# Patient Record
Sex: MALE | URBAN - METROPOLITAN AREA
[De-identification: names, ages, dates, MRNs, and addresses within clinical notes are randomized per-mention and may not be internally consistent; named-entity substitution may affect disease eponyms.]

---

## 2023-01-01 ENCOUNTER — EMERGENCY (EMERGENCY)
Facility: HOSPITAL | Age: 78
LOS: 1 days | Discharge: HOME | End: 2023-01-01
Attending: EMERGENCY MEDICINE | Admitting: EMERGENCY MEDICINE
Payer: MEDICARE

## 2023-01-01 VITALS
TEMPERATURE: 98 F | WEIGHT: 177.91 LBS | HEART RATE: 63 BPM | DIASTOLIC BLOOD PRESSURE: 64 MMHG | RESPIRATION RATE: 16 BRPM | SYSTOLIC BLOOD PRESSURE: 96 MMHG | OXYGEN SATURATION: 99 % | HEIGHT: 71 IN

## 2023-01-01 VITALS
SYSTOLIC BLOOD PRESSURE: 154 MMHG | RESPIRATION RATE: 18 BRPM | HEART RATE: 100 BPM | HEIGHT: 71 IN | OXYGEN SATURATION: 98 % | WEIGHT: 173.06 LBS | DIASTOLIC BLOOD PRESSURE: 96 MMHG | TEMPERATURE: 98 F

## 2023-01-01 VITALS
RESPIRATION RATE: 16 BRPM | SYSTOLIC BLOOD PRESSURE: 142 MMHG | OXYGEN SATURATION: 98 % | HEART RATE: 82 BPM | DIASTOLIC BLOOD PRESSURE: 85 MMHG

## 2023-01-01 VITALS
HEART RATE: 72 BPM | OXYGEN SATURATION: 98 % | SYSTOLIC BLOOD PRESSURE: 140 MMHG | DIASTOLIC BLOOD PRESSURE: 78 MMHG | TEMPERATURE: 98 F | RESPIRATION RATE: 20 BRPM

## 2023-01-01 LAB
ALBUMIN SERPL ELPH-MCNC: 3 G/DL — LOW (ref 3.3–5)
ALP SERPL-CCNC: 51 U/L — SIGNIFICANT CHANGE UP (ref 30–120)
ALT FLD-CCNC: 20 U/L DA — SIGNIFICANT CHANGE UP (ref 10–60)
ANION GAP SERPL CALC-SCNC: 8 MMOL/L — SIGNIFICANT CHANGE UP (ref 5–17)
APPEARANCE UR: CLEAR — SIGNIFICANT CHANGE UP
APTT BLD: 25.4 SEC — LOW (ref 27.5–35.5)
AST SERPL-CCNC: 16 U/L — SIGNIFICANT CHANGE UP (ref 10–40)
BASOPHILS # BLD AUTO: 0.02 K/UL — SIGNIFICANT CHANGE UP (ref 0–0.2)
BASOPHILS NFR BLD AUTO: 0.4 % — SIGNIFICANT CHANGE UP (ref 0–2)
BILIRUB SERPL-MCNC: 1.2 MG/DL — SIGNIFICANT CHANGE UP (ref 0.2–1.2)
BILIRUB UR-MCNC: NEGATIVE — SIGNIFICANT CHANGE UP
BUN SERPL-MCNC: 28 MG/DL — HIGH (ref 7–23)
CALCIUM SERPL-MCNC: 8.7 MG/DL — SIGNIFICANT CHANGE UP (ref 8.4–10.5)
CHLORIDE SERPL-SCNC: 104 MMOL/L — SIGNIFICANT CHANGE UP (ref 96–108)
CO2 SERPL-SCNC: 24 MMOL/L — SIGNIFICANT CHANGE UP (ref 22–31)
COLOR SPEC: YELLOW — SIGNIFICANT CHANGE UP
CREAT SERPL-MCNC: 1.53 MG/DL — HIGH (ref 0.5–1.3)
DIFF PNL FLD: NEGATIVE — SIGNIFICANT CHANGE UP
EGFR: 46 ML/MIN/1.73M2 — LOW
EOSINOPHIL # BLD AUTO: 0.07 K/UL — SIGNIFICANT CHANGE UP (ref 0–0.5)
EOSINOPHIL NFR BLD AUTO: 1.5 % — SIGNIFICANT CHANGE UP (ref 0–6)
GLUCOSE SERPL-MCNC: 241 MG/DL — HIGH (ref 70–99)
GLUCOSE UR QL: 250 MG/DL
HCT VFR BLD CALC: 40.3 % — SIGNIFICANT CHANGE UP (ref 39–50)
HGB BLD-MCNC: 13.6 G/DL — SIGNIFICANT CHANGE UP (ref 13–17)
IMM GRANULOCYTES NFR BLD AUTO: 0.8 % — SIGNIFICANT CHANGE UP (ref 0–0.9)
INR BLD: 1.08 RATIO — SIGNIFICANT CHANGE UP (ref 0.88–1.16)
KETONES UR-MCNC: ABNORMAL MG/DL
LEUKOCYTE ESTERASE UR-ACNC: NEGATIVE — SIGNIFICANT CHANGE UP
LYMPHOCYTES # BLD AUTO: 0.73 K/UL — LOW (ref 1–3.3)
LYMPHOCYTES # BLD AUTO: 15.3 % — SIGNIFICANT CHANGE UP (ref 13–44)
MCHC RBC-ENTMCNC: 33.4 PG — SIGNIFICANT CHANGE UP (ref 27–34)
MCHC RBC-ENTMCNC: 33.7 GM/DL — SIGNIFICANT CHANGE UP (ref 32–36)
MCV RBC AUTO: 99 FL — SIGNIFICANT CHANGE UP (ref 80–100)
MONOCYTES # BLD AUTO: 0.46 K/UL — SIGNIFICANT CHANGE UP (ref 0–0.9)
MONOCYTES NFR BLD AUTO: 9.7 % — SIGNIFICANT CHANGE UP (ref 2–14)
NEUTROPHILS # BLD AUTO: 3.44 K/UL — SIGNIFICANT CHANGE UP (ref 1.8–7.4)
NEUTROPHILS NFR BLD AUTO: 72.3 % — SIGNIFICANT CHANGE UP (ref 43–77)
NITRITE UR-MCNC: NEGATIVE — SIGNIFICANT CHANGE UP
NRBC # BLD: 0 /100 WBCS — SIGNIFICANT CHANGE UP (ref 0–0)
PH UR: 6.5 — SIGNIFICANT CHANGE UP (ref 5–8)
PLATELET # BLD AUTO: 136 K/UL — LOW (ref 150–400)
POTASSIUM SERPL-MCNC: 3.8 MMOL/L — SIGNIFICANT CHANGE UP (ref 3.5–5.3)
POTASSIUM SERPL-SCNC: 3.8 MMOL/L — SIGNIFICANT CHANGE UP (ref 3.5–5.3)
PROT SERPL-MCNC: 6.5 G/DL — SIGNIFICANT CHANGE UP (ref 6–8.3)
PROT UR-MCNC: NEGATIVE MG/DL — SIGNIFICANT CHANGE UP
PROTHROM AB SERPL-ACNC: 12.7 SEC — SIGNIFICANT CHANGE UP (ref 10.5–13.4)
RBC # BLD: 4.07 M/UL — LOW (ref 4.2–5.8)
RBC # FLD: 14.6 % — HIGH (ref 10.3–14.5)
SODIUM SERPL-SCNC: 136 MMOL/L — SIGNIFICANT CHANGE UP (ref 135–145)
SP GR SPEC: 1.02 — SIGNIFICANT CHANGE UP (ref 1–1.03)
TROPONIN I, HIGH SENSITIVITY RESULT: 7.8 NG/L — SIGNIFICANT CHANGE UP
UROBILINOGEN FLD QL: 1 MG/DL — SIGNIFICANT CHANGE UP (ref 0.2–1)
WBC # BLD: 4.76 K/UL — SIGNIFICANT CHANGE UP (ref 3.8–10.5)
WBC # FLD AUTO: 4.76 K/UL — SIGNIFICANT CHANGE UP (ref 3.8–10.5)

## 2023-01-01 PROCEDURE — 99284 EMERGENCY DEPT VISIT MOD MDM: CPT

## 2023-01-01 PROCEDURE — 85730 THROMBOPLASTIN TIME PARTIAL: CPT

## 2023-01-01 PROCEDURE — 99285 EMERGENCY DEPT VISIT HI MDM: CPT

## 2023-01-01 PROCEDURE — 85025 COMPLETE CBC W/AUTO DIFF WBC: CPT

## 2023-01-01 PROCEDURE — 93010 ELECTROCARDIOGRAM REPORT: CPT

## 2023-01-01 PROCEDURE — 85610 PROTHROMBIN TIME: CPT

## 2023-01-01 PROCEDURE — 80053 COMPREHEN METABOLIC PANEL: CPT

## 2023-01-01 PROCEDURE — 36415 COLL VENOUS BLD VENIPUNCTURE: CPT

## 2023-01-01 PROCEDURE — 93005 ELECTROCARDIOGRAM TRACING: CPT

## 2023-01-01 PROCEDURE — 84484 ASSAY OF TROPONIN QUANT: CPT

## 2023-01-01 PROCEDURE — 81003 URINALYSIS AUTO W/O SCOPE: CPT

## 2023-01-01 PROCEDURE — 70450 CT HEAD/BRAIN W/O DYE: CPT | Mod: 26,MA

## 2023-01-01 PROCEDURE — 70450 CT HEAD/BRAIN W/O DYE: CPT | Mod: MA

## 2023-01-01 PROCEDURE — 99285 EMERGENCY DEPT VISIT HI MDM: CPT | Mod: 25

## 2023-01-01 PROCEDURE — 71045 X-RAY EXAM CHEST 1 VIEW: CPT

## 2023-01-01 PROCEDURE — 96360 HYDRATION IV INFUSION INIT: CPT

## 2023-01-01 PROCEDURE — 71045 X-RAY EXAM CHEST 1 VIEW: CPT | Mod: 26

## 2023-01-01 RX ORDER — SODIUM CHLORIDE 9 MG/ML
1000 INJECTION INTRAMUSCULAR; INTRAVENOUS; SUBCUTANEOUS ONCE
Refills: 0 | Status: COMPLETED | OUTPATIENT
Start: 2023-01-01 | End: 2023-01-01

## 2023-01-01 RX ADMIN — SODIUM CHLORIDE 1000 MILLILITER(S): 9 INJECTION INTRAMUSCULAR; INTRAVENOUS; SUBCUTANEOUS at 10:09

## 2023-01-01 RX ADMIN — SODIUM CHLORIDE 1000 MILLILITER(S): 9 INJECTION INTRAMUSCULAR; INTRAVENOUS; SUBCUTANEOUS at 09:09

## 2023-07-23 NOTE — ED PROVIDER NOTE - PROGRESS NOTE DETAILS
EKG CT and labs all normal except for elevated blood sugar states he is not a diabetic and had recent hemoglobin A1c which was normal.  Could be stress related hyperglycemia.  Patient refusing further evaluation including neuro/cardio consults.  Wants to leave and will follow-up with his doctor tomorrow.  Wife in agreement with plan.  Encourage patient to drink more fluids and try to get regular sleep schedule.

## 2023-07-23 NOTE — ED PROVIDER NOTE - PATIENT PORTAL LINK FT
You can access the FollowMyHealth Patient Portal offered by [facility] by registering at the following website: [email or web address]. By joining [employer]’s FollowMyHealth portal, you will also be able to view your health information using other applications (apps) compatible with our system.

## 2023-07-23 NOTE — ED ADULT NURSE NOTE - CHIEF COMPLAINT QUOTE
syncope   pt s/p colonoscopy 1 week ago and 2 polyps removed. denies black stool no chest, abd or head pain

## 2023-07-23 NOTE — ED PROVIDER NOTE - CHPI ED SYMPTOMS NEG
no back pain/no chest pain/no cough/no fever/no nausea/no shortness of breath/no vomiting/no chills/no diaphoresis

## 2023-07-23 NOTE — ED ADULT TRIAGE NOTE - CHIEF COMPLAINT QUOTE
syncope syncope   pt s/p colonoscopy 1 week ago and 2 polyps removed. denies black stool no chest, abd or head pain

## 2023-07-23 NOTE — ED PROVIDER NOTE - OBJECTIVE STATEMENT
78-year-old male with history of hypertension and ulcerative colitis brought by ambulance from home after a syncopal episode while sitting at the kitchen table today.  Wife states patient slumped in his chair and was briefly unresponsive.  Now back to baseline.  Feels well denies any history or symptom.  Denies headache neck pain chest pain shortness of breath cough nausea vomiting diarrhea dysuria hematuria melena or other symptom.  States he had recent colonoscopy with polypectomy but otherwise uneventful course.  His PCP is Dr. Ray

## 2023-07-23 NOTE — ED PROVIDER NOTE - CLINICAL SUMMARY MEDICAL DECISION MAKING FREE TEXT BOX
78-year-old male with history of hypertension and ulcerative colitis brought by ambulance from home after a syncopal episode while sitting at the kitchen table today.  Wife states patient slumped in his chair and was briefly unresponsive.  Now back to baseline.  Feels well denies any history or symptom.  Denies headache neck pain chest pain shortness of breath cough nausea vomiting diarrhea dysuria hematuria melena or other symptom.  States he had recent colonoscopy with polypectomy but otherwise uneventful course.  His PCP is Dr. Ray    Physical exam is now normal patient was mildly hypotensive but now normal.  Impression syncope.  Plan is syncope work-up if negative will need outpatient follow-up for further evaluation and treatment.

## 2023-07-23 NOTE — ED PROVIDER NOTE - CARE PROVIDER_API CALL
Holger Ray  Internal Medicine  [address] [place] [address]  Phone: [phone]  Fax: [phone]  Established Patient  Follow Up Time: Urgent

## 2023-07-23 NOTE — ED ADULT NURSE NOTE - OBJECTIVE STATEMENT
77 y/o M PMH HTN, Ulcerative Colitis BIB EMS from home for syncopal event. Per patient he was running around the house doing errands, sat down then felt dizzy and passed out. Thinks he was out for a minute or two. Witnessed by wife, no report of sz activity. No fall or head strike. States he ahs been in usual state of health, yesterday walked 5 miles in the city. Denies similar episodes in past, no echo or stress test hx. Of note, pt had colon polyps removed last week but denies melena or hematochezia. Denies CP, SOB, n/v/d, fevers, chills, abdominal pain, urinary symptoms, weakness, fatigue, numbness, tingling in upper and lower extremities, HA, blurry vision. VSS updated on plan of care.

## 2023-12-30 PROBLEM — Z86.79 PERSONAL HISTORY OF OTHER DISEASES OF THE CIRCULATORY SYSTEM: Chronic | Status: ACTIVE | Noted: 2023-01-01

## 2023-12-30 PROBLEM — Z87.19 PERSONAL HISTORY OF OTHER DISEASES OF THE DIGESTIVE SYSTEM: Chronic | Status: ACTIVE | Noted: 2023-01-01

## 2023-12-30 NOTE — ED PROVIDER NOTE - CARE PROVIDER_API CALL
Holger Ray  Internal Medicine  [address] [place] [address]  Phone: [phone]  Fax: [phone]  Established Patient  Follow Up Time: 1-3 Days   Holger Ray  Internal Medicine  [address] [place] [address]  Phone: [phone]  Fax: [phone]  Established Patient  Follow Up Time: 1-3 Days

## 2023-12-30 NOTE — ED ADULT NURSE NOTE - NSFALLUNIVINTERV_ED_ALL_ED
Bed/Stretcher in lowest position, wheels locked, appropriate side rails in place/Call bell, personal items and telephone in reach/Instruct patient to call for assistance before getting out of bed/chair/stretcher/Non-slip footwear applied when patient is off stretcher/[place] to call system/Physically safe environment - no spills, clutter or unnecessary equipment/Purposeful proactive rounding/Room/bathroom lighting operational, light cord in reach Bed/Stretcher in lowest position, wheels locked, appropriate side rails in place/Call bell, personal items and telephone in reach/Instruct patient to call for assistance before getting out of bed/chair/stretcher/Non-slip footwear applied when patient is off stretcher/[place] to call system/Physically safe environment - no spills, clutter or unnecessary equipment/Purposeful proactive rounding/Room/bathroom lighting operational, light cord in reach

## 2023-12-30 NOTE — ED PROVIDER NOTE - PATIENT PORTAL LINK FT
You can access the FollowMyHealth Patient Portal offered by [facility] by registering at the following website: [email or web address]. By joining [email or web address]’s FollowMyHealth portal, you will also be able to view your health information using other applications (apps) compatible with our system. You can access the FollowMyHealth Patient Portal offered by [facility] by registering at the following website: [email or web address]. By joining [employer]’s FollowMyHealth portal, you will also be able to view your health information using other applications (apps) compatible with our system.

## 2023-12-30 NOTE — ED PROVIDER NOTE - OBJECTIVE STATEMENT
78-year-old male with history of hypertension on valsartan and complaining of episodes of elevated blood pressure and anxiety over the past few years.  Yesterday became very anxious and noted blood pressure was 200/100.  This morning took blood pressure and it was 180.  Has not taken his valsartan yet today.  Denies chest pain headache nausea vomiting visual disturbance shortness of breath vomiting diarrhea or other symptom. Patient was told by his PCP that he should be seeing a therapist and taking antianxiety medicine but patient has refused.  Had recent cardiac workup including stress test echo which he says was all normal.  Was told he should also see a sleep specialist since he does not sleep.  PCP Cory

## 2023-12-30 NOTE — ED PROVIDER NOTE - NSCAREINITIATED _GEN_ER
Moo Palencia(Attending) Bexarotene Counseling:  I discussed with the patient the risks of bexarotene including but not limited to hair loss, dry lips/skin/eyes, liver abnormalities, hyperlipidemia, pancreatitis, depression/suicidal ideation, photosensitivity, drug rash/allergic reactions, hypothyroidism, anemia, leukopenia, infection, cataracts, and teratogenicity.  Patient understands that they will need regular blood tests to check lipid profile, liver function tests, white blood cell count, thyroid function tests and pregnancy test if applicable.

## 2023-12-30 NOTE — ED PROVIDER NOTE - CLINICAL SUMMARY MEDICAL DECISION MAKING FREE TEXT BOX
78-year-old male with history of hypertension on valsartan and complaining of episodes of elevated blood pressure and anxiety over the past few years.  Yesterday became very anxious and noted blood pressure was 200/100.  This morning took blood pressure and it was 180.  Has not taken his valsartan yet today.  Denies chest pain headache nausea vomiting visual disturbance shortness of breath vomiting diarrhea or other symptom. Patient was told by his PCP that he should be seeing a therapist and taking antianxiety medicine but patient has refused.  Had recent cardiac workup including stress test echo which he says was all normal.  Was told he should also see a sleep specialist since he does not sleep.  PCP Bendit    Physical exam is normal.  BP is now 140/80 patient feels well.  EKG is normal.  Elevated BP readings likely related to anxiety and not to cardiac issue.  Plan is reassurance and recommend follow-up with PCP/psychiatry for further management of anxiety and BP. Patient and wife at bedside in agreement with plan.

## 2024-01-01 ENCOUNTER — EMERGENCY (EMERGENCY)
Facility: HOSPITAL | Age: 79
LOS: 1 days | Discharge: PSYCHIATRIC OR SUBSTANCE USE FACILITY | End: 2024-01-01
Attending: EMERGENCY MEDICINE | Admitting: EMERGENCY MEDICINE
Payer: MEDICARE

## 2024-01-01 ENCOUNTER — INPATIENT (INPATIENT)
Facility: HOSPITAL | Age: 79
LOS: 0 days | DRG: 922 | End: 2024-04-12
Attending: HOSPITALIST | Admitting: HOSPITALIST
Payer: MEDICARE

## 2024-01-01 VITALS
DIASTOLIC BLOOD PRESSURE: 83 MMHG | HEIGHT: 71 IN | TEMPERATURE: 98 F | RESPIRATION RATE: 18 BRPM | HEART RATE: 88 BPM | WEIGHT: 165.35 LBS | SYSTOLIC BLOOD PRESSURE: 154 MMHG | OXYGEN SATURATION: 97 %

## 2024-01-01 VITALS — HEIGHT: 71 IN | WEIGHT: 175.05 LBS

## 2024-01-01 VITALS
DIASTOLIC BLOOD PRESSURE: 90 MMHG | OXYGEN SATURATION: 96 % | TEMPERATURE: 98 F | SYSTOLIC BLOOD PRESSURE: 143 MMHG | RESPIRATION RATE: 20 BRPM | HEART RATE: 83 BPM

## 2024-01-01 VITALS — TEMPERATURE: 99 F

## 2024-01-01 DIAGNOSIS — I46.9 CARDIAC ARREST, CAUSE UNSPECIFIED: ICD-10-CM

## 2024-01-01 DIAGNOSIS — F32.A DEPRESSION, UNSPECIFIED: ICD-10-CM

## 2024-01-01 DIAGNOSIS — F22 DELUSIONAL DISORDERS: ICD-10-CM

## 2024-01-01 LAB
A1C WITH ESTIMATED AVERAGE GLUCOSE RESULT: 6.7 % — HIGH (ref 4–5.6)
ALBUMIN SERPL ELPH-MCNC: 1.9 G/DL — LOW (ref 3.3–5)
ALBUMIN SERPL ELPH-MCNC: 2.5 G/DL — LOW (ref 3.3–5)
ALBUMIN SERPL ELPH-MCNC: 3.6 G/DL — SIGNIFICANT CHANGE UP (ref 3.3–5)
ALP SERPL-CCNC: 115 U/L — SIGNIFICANT CHANGE UP (ref 30–120)
ALP SERPL-CCNC: 156 U/L — HIGH (ref 30–120)
ALP SERPL-CCNC: 71 U/L — SIGNIFICANT CHANGE UP (ref 30–120)
ALT FLD-CCNC: 24 U/L — SIGNIFICANT CHANGE UP (ref 10–60)
ALT FLD-CCNC: 261 U/L — HIGH (ref 10–60)
ALT FLD-CCNC: 639 U/L — HIGH (ref 10–60)
AMPHET UR-MCNC: NEGATIVE — SIGNIFICANT CHANGE UP
ANION GAP SERPL CALC-SCNC: 10 MMOL/L — SIGNIFICANT CHANGE UP (ref 5–17)
ANION GAP SERPL CALC-SCNC: 15 MMOL/L — SIGNIFICANT CHANGE UP (ref 5–17)
ANION GAP SERPL CALC-SCNC: 21 MMOL/L — HIGH (ref 5–17)
APAP SERPL-MCNC: 7 UG/ML — LOW (ref 10–30)
APAP SERPL-MCNC: <1 UG/ML — LOW (ref 10–30)
APPEARANCE UR: CLEAR — SIGNIFICANT CHANGE UP
APPEARANCE UR: CLEAR — SIGNIFICANT CHANGE UP
AST SERPL-CCNC: 22 U/L — SIGNIFICANT CHANGE UP (ref 10–40)
AST SERPL-CCNC: 313 U/L — HIGH (ref 10–40)
AST SERPL-CCNC: 491 U/L — HIGH (ref 10–40)
BACTERIA # UR AUTO: NEGATIVE /HPF — SIGNIFICANT CHANGE UP
BARBITURATES UR SCN-MCNC: NEGATIVE — SIGNIFICANT CHANGE UP
BASE EXCESS BLDA CALC-SCNC: -12.3 MMOL/L — LOW (ref -2–3)
BASE EXCESS BLDA CALC-SCNC: -13.4 MMOL/L — LOW (ref -2–3)
BASOPHILS # BLD AUTO: 0 K/UL — SIGNIFICANT CHANGE UP (ref 0–0.2)
BASOPHILS # BLD AUTO: 0.02 K/UL — SIGNIFICANT CHANGE UP (ref 0–0.2)
BASOPHILS NFR BLD AUTO: 0 % — SIGNIFICANT CHANGE UP (ref 0–2)
BASOPHILS NFR BLD AUTO: 0.3 % — SIGNIFICANT CHANGE UP (ref 0–2)
BENZODIAZ UR-MCNC: NEGATIVE — SIGNIFICANT CHANGE UP
BILIRUB SERPL-MCNC: 0.7 MG/DL — SIGNIFICANT CHANGE UP (ref 0.2–1.2)
BILIRUB SERPL-MCNC: 0.8 MG/DL — SIGNIFICANT CHANGE UP (ref 0.2–1.2)
BILIRUB SERPL-MCNC: 1.1 MG/DL — SIGNIFICANT CHANGE UP (ref 0.2–1.2)
BILIRUB UR-MCNC: NEGATIVE — SIGNIFICANT CHANGE UP
BILIRUB UR-MCNC: NEGATIVE — SIGNIFICANT CHANGE UP
BUN SERPL-MCNC: 28 MG/DL — HIGH (ref 7–23)
BUN SERPL-MCNC: 29 MG/DL — HIGH (ref 7–23)
BUN SERPL-MCNC: 52 MG/DL — HIGH (ref 7–23)
CALCIUM SERPL-MCNC: 8.6 MG/DL — SIGNIFICANT CHANGE UP (ref 8.4–10.5)
CALCIUM SERPL-MCNC: 8.9 MG/DL — SIGNIFICANT CHANGE UP (ref 8.4–10.5)
CALCIUM SERPL-MCNC: 9.1 MG/DL — SIGNIFICANT CHANGE UP (ref 8.4–10.5)
CHLORIDE SERPL-SCNC: 102 MMOL/L — SIGNIFICANT CHANGE UP (ref 96–108)
CHLORIDE SERPL-SCNC: 105 MMOL/L — SIGNIFICANT CHANGE UP (ref 96–108)
CHLORIDE SERPL-SCNC: 107 MMOL/L — SIGNIFICANT CHANGE UP (ref 96–108)
CK MB BLD-MCNC: 1.8 % — SIGNIFICANT CHANGE UP (ref 0–3.5)
CK MB CFR SERPL CALC: 3 NG/ML — SIGNIFICANT CHANGE UP (ref 0–3.6)
CK SERPL-CCNC: 171 U/L — SIGNIFICANT CHANGE UP (ref 39–308)
CO2 SERPL-SCNC: 18 MMOL/L — LOW (ref 22–31)
CO2 SERPL-SCNC: 22 MMOL/L — SIGNIFICANT CHANGE UP (ref 22–31)
CO2 SERPL-SCNC: 26 MMOL/L — SIGNIFICANT CHANGE UP (ref 22–31)
COCAINE METAB.OTHER UR-MCNC: NEGATIVE — SIGNIFICANT CHANGE UP
COLOR SPEC: YELLOW — SIGNIFICANT CHANGE UP
COLOR SPEC: YELLOW — SIGNIFICANT CHANGE UP
CREAT SERPL-MCNC: 1.57 MG/DL — HIGH (ref 0.5–1.3)
CREAT SERPL-MCNC: 1.88 MG/DL — HIGH (ref 0.5–1.3)
CREAT SERPL-MCNC: 3.64 MG/DL — HIGH (ref 0.5–1.3)
CULTURE RESULTS: SIGNIFICANT CHANGE UP
DIFF PNL FLD: ABNORMAL
DIFF PNL FLD: ABNORMAL
EGFR: 16 ML/MIN/1.73M2 — LOW
EGFR: 36 ML/MIN/1.73M2 — LOW
EGFR: 45 ML/MIN/1.73M2 — LOW
EOSINOPHIL # BLD AUTO: 0.01 K/UL — SIGNIFICANT CHANGE UP (ref 0–0.5)
EOSINOPHIL # BLD AUTO: 0.07 K/UL — SIGNIFICANT CHANGE UP (ref 0–0.5)
EOSINOPHIL NFR BLD AUTO: 0.1 % — SIGNIFICANT CHANGE UP (ref 0–6)
EOSINOPHIL NFR BLD AUTO: 1 % — SIGNIFICANT CHANGE UP (ref 0–6)
ESTIMATED AVERAGE GLUCOSE: 146 MG/DL — HIGH (ref 68–114)
ETHANOL SERPL-MCNC: <3 MG/DL — SIGNIFICANT CHANGE UP (ref 0–3)
ETHANOL SERPL-MCNC: <3 MG/DL — SIGNIFICANT CHANGE UP (ref 0–3)
GAS PNL BLDA: SIGNIFICANT CHANGE UP
GLUCOSE BLDC GLUCOMTR-MCNC: 308 MG/DL — HIGH (ref 70–99)
GLUCOSE BLDC GLUCOMTR-MCNC: 350 MG/DL — HIGH (ref 70–99)
GLUCOSE SERPL-MCNC: 141 MG/DL — HIGH (ref 70–99)
GLUCOSE SERPL-MCNC: 213 MG/DL — HIGH (ref 70–99)
GLUCOSE SERPL-MCNC: 407 MG/DL — HIGH (ref 70–99)
GLUCOSE UR QL: 250 MG/DL
GLUCOSE UR QL: NEGATIVE MG/DL — SIGNIFICANT CHANGE UP
HCO3 BLDA-SCNC: 16 MMOL/L — LOW (ref 21–28)
HCO3 BLDA-SCNC: 17 MMOL/L — LOW (ref 21–28)
HCT VFR BLD CALC: 41.9 % — SIGNIFICANT CHANGE UP (ref 39–50)
HCT VFR BLD CALC: 44.4 % — SIGNIFICANT CHANGE UP (ref 39–50)
HCT VFR BLD CALC: 46.6 % — SIGNIFICANT CHANGE UP (ref 39–50)
HGB BLD-MCNC: 13.3 G/DL — SIGNIFICANT CHANGE UP (ref 13–17)
HGB BLD-MCNC: 14.8 G/DL — SIGNIFICANT CHANGE UP (ref 13–17)
HGB BLD-MCNC: 14.9 G/DL — SIGNIFICANT CHANGE UP (ref 13–17)
HOROWITZ INDEX BLDA+IHG-RTO: 100 — SIGNIFICANT CHANGE UP
HOROWITZ INDEX BLDA+IHG-RTO: 75 — SIGNIFICANT CHANGE UP
IMM GRANULOCYTES NFR BLD AUTO: 0.3 % — SIGNIFICANT CHANGE UP (ref 0–0.9)
KETONES UR-MCNC: ABNORMAL MG/DL
KETONES UR-MCNC: NEGATIVE MG/DL — SIGNIFICANT CHANGE UP
LEUKOCYTE ESTERASE UR-ACNC: NEGATIVE — SIGNIFICANT CHANGE UP
LEUKOCYTE ESTERASE UR-ACNC: NEGATIVE — SIGNIFICANT CHANGE UP
LIDOCAIN IGE QN: 176 U/L — HIGH (ref 16–77)
LIDOCAIN IGE QN: 85 U/L — HIGH (ref 16–77)
LYMPHOCYTES # BLD AUTO: 0.82 K/UL — LOW (ref 1–3.3)
LYMPHOCYTES # BLD AUTO: 1.78 K/UL — SIGNIFICANT CHANGE UP (ref 1–3.3)
LYMPHOCYTES # BLD AUTO: 12.3 % — LOW (ref 13–44)
LYMPHOCYTES # BLD AUTO: 27 % — SIGNIFICANT CHANGE UP (ref 13–44)
MAGNESIUM SERPL-MCNC: 2 MG/DL — SIGNIFICANT CHANGE UP (ref 1.6–2.6)
MAGNESIUM SERPL-MCNC: 2.3 MG/DL — SIGNIFICANT CHANGE UP (ref 1.6–2.6)
MCHC RBC-ENTMCNC: 31.7 GM/DL — LOW (ref 32–36)
MCHC RBC-ENTMCNC: 31.8 GM/DL — LOW (ref 32–36)
MCHC RBC-ENTMCNC: 31.9 PG — SIGNIFICANT CHANGE UP (ref 27–34)
MCHC RBC-ENTMCNC: 32.2 PG — SIGNIFICANT CHANGE UP (ref 27–34)
MCHC RBC-ENTMCNC: 32.4 PG — SIGNIFICANT CHANGE UP (ref 27–34)
MCHC RBC-ENTMCNC: 33.6 GM/DL — SIGNIFICANT CHANGE UP (ref 32–36)
MCV RBC AUTO: 100.4 FL — HIGH (ref 80–100)
MCV RBC AUTO: 101.9 FL — HIGH (ref 80–100)
MCV RBC AUTO: 95.9 FL — SIGNIFICANT CHANGE UP (ref 80–100)
METHADONE UR-MCNC: NEGATIVE — SIGNIFICANT CHANGE UP
MONOCYTES # BLD AUTO: 0.6 K/UL — SIGNIFICANT CHANGE UP (ref 0–0.9)
MONOCYTES # BLD AUTO: 0.86 K/UL — SIGNIFICANT CHANGE UP (ref 0–0.9)
MONOCYTES NFR BLD AUTO: 13 % — SIGNIFICANT CHANGE UP (ref 2–14)
MONOCYTES NFR BLD AUTO: 9 % — SIGNIFICANT CHANGE UP (ref 2–14)
NEUTROPHILS # BLD AUTO: 3.75 K/UL — SIGNIFICANT CHANGE UP (ref 1.8–7.4)
NEUTROPHILS # BLD AUTO: 5.22 K/UL — SIGNIFICANT CHANGE UP (ref 1.8–7.4)
NEUTROPHILS NFR BLD AUTO: 57 % — SIGNIFICANT CHANGE UP (ref 43–77)
NEUTROPHILS NFR BLD AUTO: 78 % — HIGH (ref 43–77)
NITRITE UR-MCNC: NEGATIVE — SIGNIFICANT CHANGE UP
NITRITE UR-MCNC: NEGATIVE — SIGNIFICANT CHANGE UP
NRBC # BLD: 0 /100 WBCS — SIGNIFICANT CHANGE UP (ref 0–0)
NRBC # BLD: 0 /100 WBCS — SIGNIFICANT CHANGE UP (ref 0–0)
NRBC # BLD: SIGNIFICANT CHANGE UP /100 WBCS (ref 0–0)
NT-PROBNP SERPL-SCNC: 143 PG/ML — SIGNIFICANT CHANGE UP (ref 0–450)
OPIATES UR-MCNC: NEGATIVE — SIGNIFICANT CHANGE UP
PCO2 BLDA: 39 MMHG — SIGNIFICANT CHANGE UP (ref 35–48)
PCO2 BLDA: 66 MMHG — HIGH (ref 35–48)
PCP SPEC-MCNC: SIGNIFICANT CHANGE UP
PCP SPEC-MCNC: SIGNIFICANT CHANGE UP
PCP UR-MCNC: NEGATIVE — SIGNIFICANT CHANGE UP
PH BLDA: 7.03 — CRITICAL LOW (ref 7.35–7.45)
PH BLDA: 7.21 — LOW (ref 7.35–7.45)
PH UR: 5.5 — SIGNIFICANT CHANGE UP (ref 5–8)
PH UR: 5.5 — SIGNIFICANT CHANGE UP (ref 5–8)
PHOSPHATE SERPL-MCNC: 6.8 MG/DL — HIGH (ref 2.5–4.5)
PLATELET # BLD AUTO: 140 K/UL — LOW (ref 150–400)
PLATELET # BLD AUTO: 184 K/UL — SIGNIFICANT CHANGE UP (ref 150–400)
PLATELET # BLD AUTO: 228 K/UL — SIGNIFICANT CHANGE UP (ref 150–400)
PO2 BLDA: 283 MMHG — HIGH (ref 83–108)
PO2 BLDA: 290 MMHG — HIGH (ref 83–108)
POTASSIUM SERPL-MCNC: 3.4 MMOL/L — LOW (ref 3.5–5.3)
POTASSIUM SERPL-MCNC: 4.1 MMOL/L — SIGNIFICANT CHANGE UP (ref 3.5–5.3)
POTASSIUM SERPL-MCNC: 4.1 MMOL/L — SIGNIFICANT CHANGE UP (ref 3.5–5.3)
POTASSIUM SERPL-SCNC: 3.4 MMOL/L — LOW (ref 3.5–5.3)
POTASSIUM SERPL-SCNC: 4.1 MMOL/L — SIGNIFICANT CHANGE UP (ref 3.5–5.3)
POTASSIUM SERPL-SCNC: 4.1 MMOL/L — SIGNIFICANT CHANGE UP (ref 3.5–5.3)
PROT SERPL-MCNC: 4.6 G/DL — LOW (ref 6–8.3)
PROT SERPL-MCNC: 5.4 G/DL — LOW (ref 6–8.3)
PROT SERPL-MCNC: 7.3 G/DL — SIGNIFICANT CHANGE UP (ref 6–8.3)
PROT UR-MCNC: 30 MG/DL
PROT UR-MCNC: SIGNIFICANT CHANGE UP MG/DL
RBC # BLD: 4.11 M/UL — LOW (ref 4.2–5.8)
RBC # BLD: 4.63 M/UL — SIGNIFICANT CHANGE UP (ref 4.2–5.8)
RBC # BLD: 4.64 M/UL — SIGNIFICANT CHANGE UP (ref 4.2–5.8)
RBC # FLD: 13.8 % — SIGNIFICANT CHANGE UP (ref 10.3–14.5)
RBC # FLD: 14.3 % — SIGNIFICANT CHANGE UP (ref 10.3–14.5)
RBC # FLD: 15.9 % — HIGH (ref 10.3–14.5)
RBC CASTS # UR COMP ASSIST: 3 /HPF — SIGNIFICANT CHANGE UP (ref 0–4)
SALICYLATES SERPL-MCNC: 1.9 MG/DL — LOW (ref 2.8–20)
SALICYLATES SERPL-MCNC: <0.2 MG/DL — LOW (ref 2.8–20)
SAO2 % BLDA: 100 % — HIGH (ref 94–98)
SAO2 % BLDA: 100 % — HIGH (ref 94–98)
SARS-COV-2 RNA SPEC QL NAA+PROBE: SIGNIFICANT CHANGE UP
SODIUM SERPL-SCNC: 138 MMOL/L — SIGNIFICANT CHANGE UP (ref 135–145)
SODIUM SERPL-SCNC: 142 MMOL/L — SIGNIFICANT CHANGE UP (ref 135–145)
SODIUM SERPL-SCNC: 146 MMOL/L — HIGH (ref 135–145)
SP GR SPEC: 1.01 — SIGNIFICANT CHANGE UP (ref 1–1.03)
SP GR SPEC: 1.02 — SIGNIFICANT CHANGE UP (ref 1–1.03)
SPECIMEN SOURCE: SIGNIFICANT CHANGE UP
THC UR QL: NEGATIVE — SIGNIFICANT CHANGE UP
TROPONIN I, HIGH SENSITIVITY RESULT: 1088 NG/L — HIGH
TROPONIN I, HIGH SENSITIVITY RESULT: 169.7 NG/L — HIGH
TROPONIN I, HIGH SENSITIVITY RESULT: 9108.3 NG/L — HIGH
TSH SERPL-MCNC: 1.81 UIU/ML — SIGNIFICANT CHANGE UP (ref 0.27–4.2)
UROBILINOGEN FLD QL: 0.2 MG/DL — SIGNIFICANT CHANGE UP (ref 0.2–1)
UROBILINOGEN FLD QL: 1 MG/DL — SIGNIFICANT CHANGE UP (ref 0.2–1)
WBC # BLD: 33.03 K/UL — HIGH (ref 3.8–10.5)
WBC # BLD: 6.58 K/UL — SIGNIFICANT CHANGE UP (ref 3.8–10.5)
WBC # BLD: 6.69 K/UL — SIGNIFICANT CHANGE UP (ref 3.8–10.5)
WBC # FLD AUTO: 33.03 K/UL — HIGH (ref 3.8–10.5)
WBC # FLD AUTO: 6.58 K/UL — SIGNIFICANT CHANGE UP (ref 3.8–10.5)
WBC # FLD AUTO: 6.69 K/UL — SIGNIFICANT CHANGE UP (ref 3.8–10.5)
WBC UR QL: 3 /HPF — SIGNIFICANT CHANGE UP (ref 0–5)

## 2024-01-01 PROCEDURE — 36415 COLL VENOUS BLD VENIPUNCTURE: CPT

## 2024-01-01 PROCEDURE — 85027 COMPLETE CBC AUTOMATED: CPT

## 2024-01-01 PROCEDURE — 72125 CT NECK SPINE W/O DYE: CPT | Mod: 26,MC

## 2024-01-01 PROCEDURE — 94664 DEMO&/EVAL PT USE INHALER: CPT

## 2024-01-01 PROCEDURE — 82962 GLUCOSE BLOOD TEST: CPT

## 2024-01-01 PROCEDURE — 92950 HEART/LUNG RESUSCITATION CPR: CPT

## 2024-01-01 PROCEDURE — 81001 URINALYSIS AUTO W/SCOPE: CPT

## 2024-01-01 PROCEDURE — 99291 CRITICAL CARE FIRST HOUR: CPT | Mod: FT,25

## 2024-01-01 PROCEDURE — 93005 ELECTROCARDIOGRAM TRACING: CPT

## 2024-01-01 PROCEDURE — 84443 ASSAY THYROID STIM HORMONE: CPT

## 2024-01-01 PROCEDURE — 80307 DRUG TEST PRSMV CHEM ANLYZR: CPT

## 2024-01-01 PROCEDURE — 94799 UNLISTED PULMONARY SVC/PX: CPT

## 2024-01-01 PROCEDURE — 71045 X-RAY EXAM CHEST 1 VIEW: CPT

## 2024-01-01 PROCEDURE — 70450 CT HEAD/BRAIN W/O DYE: CPT | Mod: MC

## 2024-01-01 PROCEDURE — 80053 COMPREHEN METABOLIC PANEL: CPT

## 2024-01-01 PROCEDURE — 93010 ELECTROCARDIOGRAM REPORT: CPT

## 2024-01-01 PROCEDURE — 90792 PSYCH DIAG EVAL W/MED SRVCS: CPT | Mod: 2W

## 2024-01-01 PROCEDURE — 83735 ASSAY OF MAGNESIUM: CPT

## 2024-01-01 PROCEDURE — 74176 CT ABD & PELVIS W/O CONTRAST: CPT | Mod: 26,MC

## 2024-01-01 PROCEDURE — 99239 HOSP IP/OBS DSCHRG MGMT >30: CPT

## 2024-01-01 PROCEDURE — 85025 COMPLETE CBC W/AUTO DIFF WBC: CPT

## 2024-01-01 PROCEDURE — 36600 WITHDRAWAL OF ARTERIAL BLOOD: CPT

## 2024-01-01 PROCEDURE — 87086 URINE CULTURE/COLONY COUNT: CPT

## 2024-01-01 PROCEDURE — 83036 HEMOGLOBIN GLYCOSYLATED A1C: CPT

## 2024-01-01 PROCEDURE — 71250 CT THORAX DX C-: CPT | Mod: 26,MC

## 2024-01-01 PROCEDURE — 71045 X-RAY EXAM CHEST 1 VIEW: CPT | Mod: 26

## 2024-01-01 PROCEDURE — 82803 BLOOD GASES ANY COMBINATION: CPT

## 2024-01-01 PROCEDURE — 72125 CT NECK SPINE W/O DYE: CPT | Mod: MC

## 2024-01-01 PROCEDURE — 83690 ASSAY OF LIPASE: CPT

## 2024-01-01 PROCEDURE — 99222 1ST HOSP IP/OBS MODERATE 55: CPT | Mod: AI

## 2024-01-01 PROCEDURE — 71250 CT THORAX DX C-: CPT | Mod: MC

## 2024-01-01 PROCEDURE — 94003 VENT MGMT INPAT SUBQ DAY: CPT

## 2024-01-01 PROCEDURE — 94002 VENT MGMT INPAT INIT DAY: CPT

## 2024-01-01 PROCEDURE — 99285 EMERGENCY DEPT VISIT HI MDM: CPT

## 2024-01-01 PROCEDURE — 70450 CT HEAD/BRAIN W/O DYE: CPT | Mod: 26,MC

## 2024-01-01 PROCEDURE — 84484 ASSAY OF TROPONIN QUANT: CPT

## 2024-01-01 PROCEDURE — 87635 SARS-COV-2 COVID-19 AMP PRB: CPT

## 2024-01-01 PROCEDURE — 74176 CT ABD & PELVIS W/O CONTRAST: CPT | Mod: MC

## 2024-01-01 PROCEDURE — 84100 ASSAY OF PHOSPHORUS: CPT

## 2024-01-01 PROCEDURE — 82553 CREATINE MB FRACTION: CPT

## 2024-01-01 PROCEDURE — 83880 ASSAY OF NATRIURETIC PEPTIDE: CPT

## 2024-01-01 PROCEDURE — 82550 ASSAY OF CK (CPK): CPT

## 2024-01-01 PROCEDURE — 99285 EMERGENCY DEPT VISIT HI MDM: CPT | Mod: 25

## 2024-01-01 RX ORDER — CHLORHEXIDINE GLUCONATE 213 G/1000ML
15 SOLUTION TOPICAL EVERY 12 HOURS
Refills: 0 | Status: DISCONTINUED | OUTPATIENT
Start: 2024-01-01 | End: 2024-01-01

## 2024-01-01 RX ORDER — HYDROMORPHONE HYDROCHLORIDE 2 MG/ML
0.5 INJECTION INTRAMUSCULAR; INTRAVENOUS; SUBCUTANEOUS ONCE
Refills: 0 | Status: DISCONTINUED | OUTPATIENT
Start: 2024-01-01 | End: 2024-01-01

## 2024-01-01 RX ORDER — SODIUM CHLORIDE 9 MG/ML
1000 INJECTION, SOLUTION INTRAVENOUS
Refills: 0 | Status: DISCONTINUED | OUTPATIENT
Start: 2024-01-01 | End: 2024-01-01

## 2024-01-01 RX ORDER — NOREPINEPHRINE BITARTRATE/D5W 8 MG/250ML
0.05 PLASTIC BAG, INJECTION (ML) INTRAVENOUS
Qty: 8 | Refills: 0 | Status: DISCONTINUED | OUTPATIENT
Start: 2024-01-01 | End: 2024-01-01

## 2024-01-01 RX ORDER — ENOXAPARIN SODIUM 100 MG/ML
30 INJECTION SUBCUTANEOUS EVERY 24 HOURS
Refills: 0 | Status: DISCONTINUED | OUTPATIENT
Start: 2024-01-01 | End: 2024-01-01

## 2024-01-01 RX ORDER — EPINEPHRINE 0.3 MG/.3ML
0.05 INJECTION INTRAMUSCULAR; SUBCUTANEOUS
Qty: 4 | Refills: 0 | Status: DISCONTINUED | OUTPATIENT
Start: 2024-01-01 | End: 2024-01-01

## 2024-01-01 RX ORDER — SERTRALINE 25 MG/1
1 TABLET, FILM COATED ORAL
Refills: 0 | DISCHARGE

## 2024-01-01 RX ORDER — ROBINUL 0.2 MG/ML
0.2 INJECTION INTRAMUSCULAR; INTRAVENOUS EVERY 6 HOURS
Refills: 0 | Status: DISCONTINUED | OUTPATIENT
Start: 2024-01-01 | End: 2024-01-01

## 2024-01-01 RX ORDER — SCOPALAMINE 1 MG/3D
1 PATCH, EXTENDED RELEASE TRANSDERMAL
Refills: 0 | Status: DISCONTINUED | OUTPATIENT
Start: 2024-01-01 | End: 2024-01-01

## 2024-01-01 RX ORDER — DEXTROSE 50 % IN WATER 50 %
25 SYRINGE (ML) INTRAVENOUS ONCE
Refills: 0 | Status: DISCONTINUED | OUTPATIENT
Start: 2024-01-01 | End: 2024-01-01

## 2024-01-01 RX ORDER — VALSARTAN 80 MG/1
1 TABLET ORAL
Refills: 0 | DISCHARGE

## 2024-01-01 RX ORDER — ACETAMINOPHEN 500 MG
650 TABLET ORAL EVERY 6 HOURS
Refills: 0 | Status: DISCONTINUED | OUTPATIENT
Start: 2024-01-01 | End: 2024-01-01

## 2024-01-01 RX ORDER — HYDROMORPHONE HYDROCHLORIDE 2 MG/ML
0.2 INJECTION INTRAMUSCULAR; INTRAVENOUS; SUBCUTANEOUS
Refills: 0 | Status: DISCONTINUED | OUTPATIENT
Start: 2024-01-01 | End: 2024-01-01

## 2024-01-01 RX ORDER — QUETIAPINE FUMARATE 200 MG/1
25 TABLET, FILM COATED ORAL ONCE
Refills: 0 | Status: COMPLETED | OUTPATIENT
Start: 2024-01-01 | End: 2024-01-01

## 2024-01-01 RX ORDER — DEXTROSE 10 % IN WATER 10 %
125 INTRAVENOUS SOLUTION INTRAVENOUS ONCE
Refills: 0 | Status: DISCONTINUED | OUTPATIENT
Start: 2024-01-01 | End: 2024-01-01

## 2024-01-01 RX ORDER — DEXTROSE 50 % IN WATER 50 %
15 SYRINGE (ML) INTRAVENOUS ONCE
Refills: 0 | Status: DISCONTINUED | OUTPATIENT
Start: 2024-01-01 | End: 2024-01-01

## 2024-01-01 RX ORDER — HEPARIN SODIUM 5000 [USP'U]/ML
5000 INJECTION INTRAVENOUS; SUBCUTANEOUS EVERY 12 HOURS
Refills: 0 | Status: DISCONTINUED | OUTPATIENT
Start: 2024-01-01 | End: 2024-01-01

## 2024-01-01 RX ORDER — QUETIAPINE FUMARATE 200 MG/1
1 TABLET, FILM COATED ORAL
Refills: 0 | DISCHARGE

## 2024-01-01 RX ORDER — HYDROMORPHONE HYDROCHLORIDE 2 MG/ML
0.5 INJECTION INTRAMUSCULAR; INTRAVENOUS; SUBCUTANEOUS
Refills: 0 | Status: DISCONTINUED | OUTPATIENT
Start: 2024-01-01 | End: 2024-01-01

## 2024-01-01 RX ORDER — SODIUM CHLORIDE 9 MG/ML
1000 INJECTION INTRAMUSCULAR; INTRAVENOUS; SUBCUTANEOUS ONCE
Refills: 0 | Status: COMPLETED | OUTPATIENT
Start: 2024-01-01 | End: 2024-01-01

## 2024-01-01 RX ORDER — SODIUM CHLORIDE 9 MG/ML
1000 INJECTION INTRAMUSCULAR; INTRAVENOUS; SUBCUTANEOUS
Refills: 0 | Status: DISCONTINUED | OUTPATIENT
Start: 2024-01-01 | End: 2024-01-01

## 2024-01-01 RX ORDER — ROBINUL 0.2 MG/ML
0.2 INJECTION INTRAMUSCULAR; INTRAVENOUS ONCE
Refills: 0 | Status: COMPLETED | OUTPATIENT
Start: 2024-01-01 | End: 2024-01-01

## 2024-01-01 RX ORDER — ONDANSETRON 8 MG/1
4 TABLET, FILM COATED ORAL EVERY 8 HOURS
Refills: 0 | Status: DISCONTINUED | OUTPATIENT
Start: 2024-01-01 | End: 2024-01-01

## 2024-01-01 RX ORDER — INSULIN LISPRO 100/ML
VIAL (ML) SUBCUTANEOUS EVERY 6 HOURS
Refills: 0 | Status: DISCONTINUED | OUTPATIENT
Start: 2024-01-01 | End: 2024-01-01

## 2024-01-01 RX ORDER — ESZOPICLONE 2 MG/1
1 TABLET, COATED ORAL
Refills: 0 | DISCHARGE

## 2024-01-01 RX ORDER — DEXTROSE 50 % IN WATER 50 %
12.5 SYRINGE (ML) INTRAVENOUS ONCE
Refills: 0 | Status: DISCONTINUED | OUTPATIENT
Start: 2024-01-01 | End: 2024-01-01

## 2024-01-01 RX ORDER — PANTOPRAZOLE SODIUM 20 MG/1
40 TABLET, DELAYED RELEASE ORAL DAILY
Refills: 0 | Status: DISCONTINUED | OUTPATIENT
Start: 2024-01-01 | End: 2024-01-01

## 2024-01-01 RX ORDER — MESALAMINE 400 MG
2 TABLET, DELAYED RELEASE (ENTERIC COATED) ORAL
Refills: 0 | DISCHARGE

## 2024-01-01 RX ORDER — GLUCAGON INJECTION, SOLUTION 0.5 MG/.1ML
1 INJECTION, SOLUTION SUBCUTANEOUS ONCE
Refills: 0 | Status: DISCONTINUED | OUTPATIENT
Start: 2024-01-01 | End: 2024-01-01

## 2024-01-01 RX ORDER — TRAZODONE HCL 50 MG
25 TABLET ORAL ONCE
Refills: 0 | Status: COMPLETED | OUTPATIENT
Start: 2024-01-01 | End: 2024-01-01

## 2024-01-01 RX ORDER — LANOLIN ALCOHOL/MO/W.PET/CERES
3 CREAM (GRAM) TOPICAL AT BEDTIME
Refills: 0 | Status: DISCONTINUED | OUTPATIENT
Start: 2024-01-01 | End: 2024-01-01

## 2024-01-01 RX ORDER — AZATHIOPRINE 100 MG/1
1 TABLET ORAL
Refills: 0 | DISCHARGE

## 2024-01-01 RX ADMIN — EPINEPHRINE 14.9 MICROGRAM(S)/KG/MIN: 0.3 INJECTION INTRAMUSCULAR; SUBCUTANEOUS at 14:43

## 2024-01-01 RX ADMIN — SODIUM CHLORIDE 1000 MILLILITER(S): 9 INJECTION INTRAMUSCULAR; INTRAVENOUS; SUBCUTANEOUS at 12:20

## 2024-01-01 RX ADMIN — Medication 0.5 MILLIGRAM(S): at 11:57

## 2024-01-01 RX ADMIN — PANTOPRAZOLE SODIUM 40 MILLIGRAM(S): 20 TABLET, DELAYED RELEASE ORAL at 17:59

## 2024-01-01 RX ADMIN — Medication 0.05 MICROGRAM(S)/KG/MIN: at 13:29

## 2024-01-01 RX ADMIN — QUETIAPINE FUMARATE 25 MILLIGRAM(S): 200 TABLET, FILM COATED ORAL at 17:19

## 2024-01-01 RX ADMIN — Medication 7.44 MICROGRAM(S)/KG/MIN: at 12:42

## 2024-01-01 RX ADMIN — CHLORHEXIDINE GLUCONATE 15 MILLILITER(S): 213 SOLUTION TOPICAL at 17:07

## 2024-01-01 RX ADMIN — ROBINUL 0.2 MILLIGRAM(S): 0.2 INJECTION INTRAMUSCULAR; INTRAVENOUS at 11:57

## 2024-01-01 RX ADMIN — EPINEPHRINE 14.9 MICROGRAM(S)/KG/MIN: 0.3 INJECTION INTRAMUSCULAR; SUBCUTANEOUS at 19:50

## 2024-01-01 RX ADMIN — SODIUM CHLORIDE 100 MILLILITER(S): 9 INJECTION, SOLUTION INTRAVENOUS at 05:23

## 2024-01-01 RX ADMIN — ENOXAPARIN SODIUM 30 MILLIGRAM(S): 100 INJECTION SUBCUTANEOUS at 17:59

## 2024-01-01 RX ADMIN — SODIUM CHLORIDE 1000 MILLILITER(S): 9 INJECTION INTRAMUSCULAR; INTRAVENOUS; SUBCUTANEOUS at 14:43

## 2024-01-01 RX ADMIN — HYDROMORPHONE HYDROCHLORIDE 0.5 MILLIGRAM(S): 2 INJECTION INTRAMUSCULAR; INTRAVENOUS; SUBCUTANEOUS at 11:56

## 2024-01-01 RX ADMIN — CHLORHEXIDINE GLUCONATE 15 MILLILITER(S): 213 SOLUTION TOPICAL at 05:22

## 2024-01-01 RX ADMIN — EPINEPHRINE 14.9 MICROGRAM(S)/KG/MIN: 0.3 INJECTION INTRAMUSCULAR; SUBCUTANEOUS at 01:00

## 2024-01-01 RX ADMIN — Medication 25 MILLIGRAM(S): at 23:17

## 2024-01-01 RX ADMIN — EPINEPHRINE 14.9 MICROGRAM(S)/KG/MIN: 0.3 INJECTION INTRAMUSCULAR; SUBCUTANEOUS at 17:41

## 2024-01-01 RX ADMIN — SODIUM CHLORIDE 75 MILLILITER(S): 9 INJECTION INTRAMUSCULAR; INTRAVENOUS; SUBCUTANEOUS at 17:59

## 2024-01-01 RX ADMIN — EPINEPHRINE 14.9 MICROGRAM(S)/KG/MIN: 0.3 INJECTION INTRAMUSCULAR; SUBCUTANEOUS at 21:49

## 2024-01-01 RX ADMIN — SODIUM CHLORIDE 1000 MILLILITER(S): 9 INJECTION INTRAMUSCULAR; INTRAVENOUS; SUBCUTANEOUS at 14:44

## 2024-03-06 NOTE — ED ADULT NURSE REASSESSMENT NOTE - NS ED NURSE REASSESS COMMENT FT1
patient sleeping at this time easy to arouse keep under 1:1 observation , call tele psych x3 for patient placement still no answer

## 2024-03-06 NOTE — ED BEHAVIORAL HEALTH ASSESSMENT NOTE - RISK ASSESSMENT
risk factors: male, psychosocial stressor, possible recent SI, declining prescribed meds    protective factors: denies active SI, denies hx of SA, no known hx of violence, denies access to guns, domiciled,

## 2024-03-06 NOTE — ED ADULT NURSE REASSESSMENT NOTE - NS ED NURSE REASSESS COMMENT FT1
patient A&Ox3 in no acute distress calm and cooperative at this time , keep under 1:1 observation wife by bed side, continue to monitor

## 2024-03-06 NOTE — ED PROVIDER NOTE - PROGRESS NOTE DETAILS
Seen by telepsych Dr. Galeano who recommends admission but no beds at [facility] presently.  Recommends Seroquel 25 now and hold patient for reevaluation tomorrow. Telepsych is requesting to PCP for transfer to [facility] for admission.  Requesting COVID swab and clarification of patient's GI meds.  Patient is on Lialda 1.2 g orally 2 tabs orally once a day.  Also on azathioprine 50 mg orally once a day. Patient is medically cleared for psych admission.

## 2024-03-06 NOTE — ED BEHAVIORAL HEALTH NOTE - BEHAVIORAL HEALTH NOTE
Telepsychiatry Collateral Note:    Writer spoke to pt's psychiatrist (Dr. Chou).  He states that he met pt for the first time via telehealth ~2 weeks ago.  He states that pt was already on lexapro (prescribed by someone else) so he increased the dose from 10 to 15 mg without much effect.  Pt was not sleeping so trazodone was added with mediocre response.  Pt was still feeling miserable so mirtazapine 15 mg was added but pt did not sleep.  He returned pt back to trazodone and increased to 100 mg on 2/27.  He received a call from pt's wife yesterday that pt was doing worse, very anxious, agitated/ paranoid.  He wanted to switch pt's meds to sertraline and quetiapine yesterday but pt likely has not started them yet.  He received a call from pt's daughter today, who reported pt's mention of "goodbye."  He agreed that it would be a good idea for pt to come to the ED due to recent agitation and unclear if he would be compliant with meds.  He provides pt's daughter's number as [phone].

## 2024-03-06 NOTE — ED ADULT NURSE REASSESSMENT NOTE - NS ED NURSE REASSESS COMMENT FT1
patient will go to [facility] give report to Isamar Santillan, Dr Fabian the accepting MD

## 2024-03-06 NOTE — ED ADULT TRIAGE NOTE - NS_BH TRG QUESTION2_ED_ALL_ED
Pt reports complete resolution of pain and repeatedly requests not to proceed with CT given concern for radiation exposure.  UA w/o UTI.  Labs w/ mild leukocytosis.  US prelim w/o visualized appendix, w/ good b/l ovarian flow and no cysts. Called for attending radiology read but no final read available yet.  D/w pt and mother - she would like to go home and f/u with gyn in the morning.  She is aware that she might need to come back if pain recurs and she is comfortable with that plan.  Pt tolerating PO at this time and abd soft/benign.
No

## 2024-03-06 NOTE — ED BEHAVIORAL HEALTH NOTE - BEHAVIORAL HEALTH NOTE
ISTOP Reference #: [id number]    Practitioner Count: 1  Pharmacy Count: 1  Current Opioid Prescriptions: 0  Current Benzodiazepine Prescriptions: 0  Current Stimulant Prescriptions: 0      Patient Demographic Information (PDI)       PDI	First Name	Last Name	Birth Date	Gender	Street Address	[facility] Code  THALIA Thorne	1945	Male	[address] [place]	[address]    Prescription Information      PDI Filter:    PDI	My Rx	Current Rx	Drug Type	Rx Written	Rx Dispensed	Drug	Quantity	Days Supply	Prescriber Name	Prescriber VIANNEY #	Payment Method	Dispenser  A	N	Y		01/11/2024	02/15/2024	eszopiclone 1 mg tablet	30	30	BenditHolger MD	[id number]	Medicare	[id number]  A	N	N		01/11/2024	01/13/2024	eszopiclone 1 mg tablet	30	30	BenditHolger MD	[id number]	Medicare	[id number]  A	N	N		12/08/2023	12/15/2023	eszopiclone 1 mg tablet	30	30	BenditHolger MD	[id number]	Medicare	[id number]  A	N	N		10/19/2023	11/17/2023	eszopiclone 1 mg tablet	30	30	BenditHolger MD	[id number]	Medicare	[id number]  A	N	N		10/19/2023	10/19/2023	eszopiclone 1 mg tablet	30	30	BenditHolger MD	[id number]	Medicare	[id number]  A	N	N		10/02/2023	10/02/2023	eszopiclone 1 mg tablet	30	15	BenditHolger MD	[id number]	Medicare	[id number]

## 2024-03-06 NOTE — ED ADULT NURSE REASSESSMENT NOTE - NS ED NURSE REASSESS COMMENT FT1
patient has a tremors R hand Md aware no other orders at this time , patient calm eating at this time keep under 1:1 observation

## 2024-03-06 NOTE — ED ADULT NURSE REASSESSMENT NOTE - NS ED NURSE REASSESS COMMENT FT1
patient calm at this times , denied any SI/HI at this time patient calm at this times but upset " because I want to go home"  , denied any SI/HI at this time, keep under 1:1 observation wife and patient aware we are waiting for psych evaluation report
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
## 2024-03-06 NOTE — ED PROVIDER NOTE - ENMT NEGATIVE STATEMENT, MLM
Ears: no ear pain and no hearing problems. Nose: no nasal congestion and no nasal drainage. Mouth/Throat: no dysphagia, no hoarseness and no throat pain. Neck: no lumps, no pain, no stiffness and no swollen glands.
Viral syndrome

## 2024-03-06 NOTE — ED ADULT NURSE NOTE - CADM POA URETHRAL CATHETER
"  Subjective:      42 y.o. male presents to urgent care for cold symptoms that started on Friday.  He is experiencing increased sinus pressure and sore throat.  No fever, body ache, headache, or diarrhea. No tobacco product use. No history of asthma or COPD. He is not vaccinated against COVID. No known sick contacts.     He denies any other questions or concerns at this time.    Current problem list, medication, and past medical/surgical history were reviewed in Epic.    ROS  See HPI     Objective:      /82 (BP Location: Right arm, Patient Position: Sitting, BP Cuff Size: Adult long)   Pulse 76   Temp 36.6 °C (97.8 °F) (Temporal)   Resp 14   Ht 1.753 m (5' 9\")   Wt 112 kg (247 lb)   SpO2 98%   BMI 36.48 kg/m²     Physical Exam  Constitutional:       General: He is not in acute distress.     Appearance: He is not diaphoretic.   HENT:      Right Ear: Tympanic membrane, ear canal and external ear normal.      Left Ear: Tympanic membrane, ear canal and external ear normal.      Mouth/Throat:      Tongue: Tongue does not deviate from midline.      Palate: No lesions.      Pharynx: Uvula midline. Posterior oropharyngeal erythema present.      Tonsils: No tonsillar exudate. 1+ on the right. 1+ on the left.   Cardiovascular:      Rate and Rhythm: Normal rate and regular rhythm.      Heart sounds: Normal heart sounds.   Pulmonary:      Effort: Pulmonary effort is normal. No respiratory distress.      Breath sounds: Normal breath sounds.   Neurological:      Mental Status: He is alert.   Psychiatric:         Mood and Affect: Affect normal.         Judgment: Judgment normal.       Assessment/Plan:     1. Sore throat  Rapid strep negative.  Symptoms most consistent with virus.  Tylenol, ibuprofen, gargle warm salt water as needed for symptomatic relief.  - POCT CEPHEID GROUP A STREP - PCR      Instructed to return to Urgent Care or nearest Emergency Department if symptoms fail to improve, for any change in " condition, further concerns, or new concerning symptoms. Patient states understanding of the plan of care and discharge instructions.    Geno Mckeon M.D.    No

## 2024-03-06 NOTE — ED PROVIDER NOTE - NSRECPHYSICIAN_ED_A_ED_FT
Infectious Disease Progress Note  2022   Patient Name: Marlene Back : 1928   Impression  Pyelonephritis secondary to Enterococcus faecalis Complicated UTI:  Multifocal Pneumonia and Pulmonary Edema Complicated by Acute Hypoxic Respiratory Failure:  Afebrile, no leukocytosis  CRP on DWT, patient appears to be improving as oxygen needs have decreased  -UA WBC 9, RBC none, Urine Culture: Enterococcus faecalis 50,000  -CT Chest WO Contrast: Bilateral pulmonary infiltrates and bilateral pleural effusions with probable   reactive mediastinal adenopathy   Probable granuloma in the right middle lobe. No follow-up imaging   recommended. Oxygen needs have increased from  of 5 L/min/nc to 11/15 of 10/L HFNC  Dr. Mago Dixon onboard for pulmonology, imp of possible atypical pneumonia  PAUL on CKD3:  Dr. Tena Rodriguez onboard  CAD/ HTN/ HLD/ Mod to Severe AS/ Mild to mod PHTN:  Dr. Guzman Drivers onboard  11/15-Limited Echo: EF 55-60%, Mod to severe AS, mild to mod AR, mild to mod TR.   OA/ Eczema/ Gout:  Hypothyroidism:  Allergy to cephalexin (rash)  Multi-morbidity: per PMHx:  s/p hyster, colonoscopy  Plan:  Continue IV meropenem 500 mg q12h (renal dosing), plan to treat x 14 days total of ABX therapy (end date 22)  Trend CRP and Pct, ordered    Ongoing Antimicrobial Therapy  Meropenem ? Completed Antimicrobial Therapy  Levofloxacin -?? History:? Interval history noted. Chief complaint: pyelonephritis. Multifocal pneumonia with acute hypoxic respiratory failure. Denies n/v/d/f or untoward effects of antibiotics. States no longer has dyspnea and denies any supra-pubic or CVA tenderness. Physical Exam:  Vital Signs: BP (!) 160/58   Pulse 65   Temp 98.5 °F (36.9 °C)   Resp 18   Ht 5' (1.524 m)   Wt 134 lb 4.2 oz (60.9 kg)   LMP  (LMP Unknown)   SpO2 90%   BMI 26.22 kg/m²     Gen: Remains A&O x 4, no distress up in the chair. Chest: no distress and CTA.  Posterior breath sounds with bibasilar fine crackles. Oxygen per HFNC  Heart: NSR and no MRG. Abd: soft, non-distended, no tenderness, no hepatomegaly. Normoactive bowel sounds. Bilateral CVA tenderness has subsided. Ext: no clubbing, cyanosis, or edema  Neuro: Mental status intact. CN 2-12 intact and no focal sensory or motor deficits     Radiologic / Imaging / TESTING  11/14/22 XR Chest Portable:  Impression   Cardiomegaly       Bilateral ill-defined pulmonary opacities could represent multifocal   pneumonia or edema      11/14/22 CT Chest WO Contrast:  Impression   Bilateral pulmonary infiltrates and bilateral pleural effusions with probable   reactive mediastinal adenopathy       Probable granuloma in the right middle lobe. No follow-up imaging   recommended. 11/14/22 CT Chest WO Contrast:  Impression   Bilateral pulmonary infiltrates and bilateral pleural effusions with probable   reactive mediastinal adenopathy       Probable granuloma in the right middle lobe. No follow-up imaging   recommended. 11/15/22 Limited Echo:   Summary   Left ventricular systolic function is normal.   Ejection fraction is visually estimated at 55-60%. Moderately dilated left atrium. Moderate to severe aortic stenosis is present; Mean PG 40mmHg, YANIRA 0.99 cm2   Mitral annular calcification is present. Mild to moderate mitral regurgitation. Mild to moderate tricuspid regurgitation; RVSP: 44 mmHg. No evidence of any pericardial effusion. 11/16/22 XR Chest Portable:  Impression   Extensive bilateral airspace opacities. Suspected small bilateral pleural effusions. 11/17/22 CT Abdomen Pelvis WO Contrast:  Impression   1. Negative for urinary tract stones. 2. Consolidation in the lower lobes and bilateral pleural effusions. 11/19/22 XR Chest Portable:  Impression   Multifocal airspace opacities are redemonstrated and not significantly   changed. Considerations would still be multifocal pneumonia or edema. Problems  Principal Problem:    Sepsis ([facility] [address]  Active Problems:    PAUL (acute kidney injury) ([facility] [address]    Dyspnea and respiratory abnormalities    Pneumonia due to infectious organism    Pyelonephritis    Chronic kidney disease  Resolved Problems:    * No resolved hospital problems. *    Electronically signed by: Electronically signed by CLEMENT Santacruz CNP on 11/21/2022 at 12:52 PM Psychiatry

## 2024-03-06 NOTE — ED ADULT NURSE NOTE - NAME OF THE PERSON WHO RECEIVED REPORT AT THE FACILITY THE PATIENT IS TRANSFERRING TO
JOAO Lewis RN at [place] gave report to Report given to RN on [place] by JOAO Lewis RN Report given to FELI Penn on [place] by FELI Lewis

## 2024-03-06 NOTE — ED ADULT TRIAGE NOTE - CHIEF COMPLAINT QUOTE
as per wife " he is having severe paranoid thoughts and agitation - he is up all night unable to function He will take his medication "  PMH Anxiety Depression

## 2024-03-06 NOTE — ED PROVIDER NOTE - OBJECTIVE STATEMENT
78-year-old male with history of colitis recently started taking Lexapro prescribed by psychiatrist Dr. Chou in [facility], brought by wife for evaluation of increasing paranoid thoughts for the past week.  Patient states his credit card was hacked plan online scammer and he now feels that his accounts and his life is overall under control of the same scammer.  Wife at bedside states that credit card was indeed hacked but that problem has since been resolved and that they are no longer in danger.  Patient expresses a extreme anxiety regarding his bank accounts and his phone and thinks someone is out to get him.  Denies suicidal homicidal thoughts.  Never had a psych admission.  Denies drugs of abuse or alcohol.  His PCP is Dr. Ray

## 2024-03-06 NOTE — ED BEHAVIORAL HEALTH ASSESSMENT NOTE - HPI (INCLUDE ILLNESS QUALITY, SEVERITY, DURATION, TIMING, CONTEXT, MODIFYING FACTORS, ASSOCIATED SIGNS AND SYMPTOMS)
The patient is a 78-year-old male; domiciled with wife; PMHx of ulcerative colitis; PPHx of recently starting outpatient tx, denies hx of SA, no known hx of violence, no prior admissions; denies recent substance use (hx of tobacco, alcohol, marijuana); BIB wife; psychiatry consulted for evaluation.  Pt states his wife brought him to the hospital because he has been fighting taking some of his prescribed medications.  He reports that he had pre-existing anxiety but symptoms worsened after a situation of identity theft.  He states that he is "the only one who knows about it" and nobody else believes it, that he can't prove it so people will think he's crazy.  He reports that his phone and computers are being monitored since his [employer] account got hacked.  He states that they are so advances that no one else knows he is being recorded and followed in real time.  He notes that he changed his billing address so he is no longer getting his bills.  He feels that if he doesn't start correcting what's wrong it will snowball thought doesn't know how to correct things.  He reports trouble eating, weight loss (12 lbs in a few months), feeling anxious and depressed (sometimes), with poor sleep (only a couple hours/night), low energy, anhedonia, difficulty concentrating.  When asked about passive SI, pt has long pause before responding and saying, "I don't know if it's gone that far."  Pt denies current active SI/HI/AVH.  Pt states the psychiatrist is prescribing different medications every day and he does not want to continue trying them, states he had some success when he was previously on trazodone and lexapro.    Covid Screen - Patient  reportedly tested positive in January  denies recent known exposures in the past 2 weeks

## 2024-04-11 NOTE — CONSULT NOTE ADULT - ASSESSMENT
78-year-old male who presented as a cardiac arrest with ROSC status post reported self hanging.  Past medical history of hypertension ulcerative colitis.  Was recently seen in the emergency room on March 6 for evaluation of increasing paranoia.     resp failure  card arrest  rosc  UC  depression  paranoia hx  met acidosis  PRESTON  CKD    prognosis poor  spoke with family  they are leaning toward DNR and CMO  [facility] care and support  I and O  hydration  trend LABS  oral hygiene  skin care  HOB elev  asp prec  CT imaging reviewed  LABS reviewed  old records reviewed

## 2024-04-11 NOTE — PROGRESS NOTE ADULT - ASSESSMENT
78 year old male with a PMH of HTN, ulcerative colitis, worsening anxiety/paranoia recently who presented to the ED s/p cardiac arrest with ROSC after a suicide attempt by hanging.    Problem list:  Cardiac arrest  Suicide attempt  Acute hypoxic respiratory failure   Respiratory/metabolic acidosis   PRESTON  Transaminitis    Neuro: Remains unresponsive without sedation.  Original CT brain with no acute findings.  Will likely require repeat CT brain/EEG for further neuroprognostication.    CV: On epi drip for hemodynamic support.  Titrating for MAP >65.  Elevated trops likely demand in setting of cardiac arrest.  Will trend.   Pulm: On full vent support.  Will wean fiO2 as tolerated.    GI: NPO.  Protonix for stress ulcer prophylaxis.  Transaminitis likely shock liver.  CT abd/pel with no acute findings.  Trend LFTs.   Renal: Frey in place. Patient with minimal UOP thus far.  Switched NS to LR to avoid worsening metabolic acidosis.  Replete electrolytes prn.   Heme: Heparin subq for DVT prophylaxis.   ID: Afebrile, no leukocytosis.  Monitor off abx.   Endocrine: q6 hr fingersticks while NPO. Goal blood glucose 110-180    Dispo: Continue SPCU level of care.  Patient made DNR.  MOLST in chart.  Family leaning towards comfort measures.     45 minutes of critical care time spent assessing presenting problems of acute illness, which pose high probability of life threatening deterioration or end organ damage/dysfunction, as well as medical decision making including initiating plan of care, reviewing data, reviewing radiologic exams, discussing with multidisciplinary team,  discussing goals of care with patient/family, and writing this note.  Non-inclusive of procedures performed.  Date of entry of this note is equal to the date of services rendered.  78 year old male with a PMH of HTN, ulcerative colitis, worsening anxiety/paranoia recently as per chart review who presented to the ED s/p cardiac arrest with ROSC after a reported suicide attempt by hanging.    Problem list:  Cardiac arrest  Suicide attempt  Shock  Acute hypoxic respiratory failure   Respiratory/metabolic acidosis   PRESTON  Transaminitis    Neuro: Remains unresponsive without sedation.  Original CT brain with no acute findings.  Will likely require repeat CT brain/EEG for further neuroprognostication.    CV: On epi drip for hemodynamic support.  Titrating for MAP >65.  Elevated trops likely demand in setting of cardiac arrest.  Will trend.   Pulm: On full vent support.  Will wean fiO2 as tolerated.    GI: NPO.  Protonix for stress ulcer prophylaxis.  Transaminitis likely shock liver.  CT abd/pel with no acute findings.  Trend LFTs.   Renal: Frey in place. Patient with minimal UOP thus far.  Switched NS to LR to avoid worsening metabolic acidosis.  Replete electrolytes prn.   Heme: Heparin subq for DVT prophylaxis.   ID: Afebrile, no leukocytosis.  Monitor off abx.   Endocrine: q6 hr fingersticks while NPO. Goal blood glucose 110-180.    Dispo: Continue SPCU level of care.  Patient made DNR.  MOLST in chart.  Family leaning towards comfort measures.     45 minutes of critical care time spent assessing presenting problems of acute illness, which pose high probability of life threatening deterioration or end organ damage/dysfunction, as well as medical decision making including initiating plan of care, reviewing data, reviewing radiologic exams, discussing with multidisciplinary team,  discussing goals of care with patient/family, and writing this note.  Non-inclusive of procedures performed.  Date of entry of this note is equal to the date of services rendered.  78 year old male with a PMH of HTN, ulcerative colitis, worsening anxiety/paranoia recently as per chart review who presented to the ED s/p cardiac arrest with ROSC after a reported suicide attempt by hanging.    Problem list:  Cardiac arrest  Suicide attempt  Shock  Acute hypoxic respiratory failure   Respiratory/metabolic acidosis   PRESTON  Transaminitis  Hyperglycemia     Neuro: Remains unresponsive without sedation.  Original CT brain with no acute findings.  Will likely require repeat CT brain/EEG for further neuroprognostication.    CV: On epi drip for hemodynamic support.  Titrating for MAP >65.  Elevated trops likely demand in setting of cardiac arrest.  Will trend.   Pulm: On full vent support.  Will wean fiO2 as tolerated.    GI: NPO.  Protonix for stress ulcer prophylaxis.  Transaminitis likely shock liver.  CT abd/pel with no acute findings.  Trend LFTs.   Renal: Frey in place. Patient with minimal UOP thus far.  Switched NS to LR to avoid worsening metabolic acidosis.  Replete electrolytes prn.  Monitor renal function and UOP.  Heme: Heparin subq for DVT prophylaxis.   ID: Afebrile, no leukocytosis.  Monitor off abx.   Endocrine: q6 hr fingersticks while NPO. Goal blood glucose 110-180.  Added ISS for hyperglycemia.     Dispo: Continue SPCU level of care.  Patient made DNR.  MOLST in chart.  Family leaning towards comfort measures.     45 minutes of critical care time spent assessing presenting problems of acute illness, which pose high probability of life threatening deterioration or end organ damage/dysfunction, as well as medical decision making including initiating plan of care, reviewing data, reviewing radiologic exams, discussing with multidisciplinary team,  discussing goals of care with patient/family, and writing this note.  Non-inclusive of procedures performed.  Date of entry of this note is equal to the date of services rendered.

## 2024-04-11 NOTE — H&P ADULT - NSHPPHYSICALEXAM_GEN_ALL_CORE
NAD   Intubated on vent  Pupils sluggish but equally reactive   No gross neck injuries, bruises or trauma  Clear to auscultation bilaterally  S1, S2 present, no obvious murmurs or rubs  Soft, non tender, non distended   +mattson   No calf swelling or edema

## 2024-04-11 NOTE — H&P ADULT - HISTORY OF PRESENT ILLNESS
78M with anxiety, depression, paranoia, ulcerative colitis and HTN presenting as cardiac arrest with ROSC after hanging himself in suicide attempt. EMS noted rhythm as reportedly asystole. Received 3 epi, 1 bicarb, and 1 calcium and intubated. Unknown time down. The wife found him kneeling on the floor hung when she returned home. The patient was seen in [facility] ED on March 6th for increasing paranoia and anxiety and was transferred to [facility] per psychiatry. he was on lexapro. According to the wife, the pt has had no prior suicide attempts or psychiatric hospitalizations. Reports he had been experiencing more paranoia lately

## 2024-04-11 NOTE — PROGRESS NOTE ADULT - SUBJECTIVE AND OBJECTIVE BOX
Patient is a 78y old  Male who presents with a chief complaint of cardiac arrest (11 Apr 2024 17:07)    BRIEF HOSPITAL COURSE: 78 year old male with a PMH of HTN, ulcerative colitis, worsening anxiety/paranoia recently who presented to the ED s/p cardiac arrest with ROSC after a suicide attempt by hanging.  Was intubated in the field and received 3 epi, 1 bicarb, and 1 calcium.  As per ED note approximately 20 minutes to ROSC.  Unknown down time prior.  Admitted to SPCU for further management.      ROS: Unable to obtain     PAST MEDICAL & SURGICAL HISTORY:  H/O: HTN (hypertension)  H/O ulcerative colitis      Medications:  EPINEPHrine    Infusion 0.05 MICROgram(s)/kG/Min IV Continuous <Continuous>  norepinephrine Infusion 0.05 MICROgram(s)/kG/Min IV Continuous <Continuous>  acetaminophen     Tablet .. 650 milliGRAM(s) Oral every 6 hours PRN  melatonin 3 milliGRAM(s) Oral at bedtime PRN  ondansetron Injectable 4 milliGRAM(s) IV Push every 8 hours PRN  enoxaparin Injectable 30 milliGRAM(s) SubCutaneous every 24 hours  aluminum hydroxide/magnesium hydroxide/simethicone Suspension 30 milliLiter(s) Oral every 4 hours PRN  pantoprazole  Injectable 40 milliGRAM(s) IV Push daily  sodium chloride 0.9%. 1000 milliLiter(s) IV Continuous <Continuous>  chlorhexidine 0.12% Liquid 15 milliLiter(s) Oral Mucosa every 12 hours        Mode: AC/ CMV (Assist Control/ Continuous Mandatory Ventilation)  RR (machine): 18  TV (machine): 450  FiO2: 100  PEEP: 5  ITime: 1  MAP: 10  PIP: 27      ICU Vital Signs Last 24 Hrs  T(C): 36.7 (11 Apr 2024 19:00), Max: 36.7 (11 Apr 2024 19:00)  T(F): 98.1 (11 Apr 2024 19:00), Max: 98.1 (11 Apr 2024 19:00)  HR: 109 (11 Apr 2024 19:00) (86 - 121)  BP: 106/80 (11 Apr 2024 19:00) (48/28 - 188/85)  BP(mean): 90 (11 Apr 2024 19:00) (80 - 98)  ABP: --  ABP(mean): --  RR: 17 (11 Apr 2024 19:00) (14 - 26)  SpO2: 100% (11 Apr 2024 19:00) (94% - 100%)    O2 Parameters below as of 11 Apr 2024 16:05  Patient On (Oxygen Delivery Method): ventilator, PEEP 5    O2 Concentration (%): 100        ABG - ( 11 Apr 2024 12:34 )  pH, Arterial: 7.03  pH, Blood: x     /  pCO2: 66    /  pO2: 290   / HCO3: 17    / Base Excess: -13.4 /  SaO2: 100.0         I&O's Detail    11 Apr 2024 07:01  -  11 Apr 2024 19:28  --------------------------------------------------------  IN:    EPINEPHrine: 476.4 mL    sodium chloride 0.9%: 150 mL  Total IN: 626.4 mL    OUT:    Indwelling Catheter - Urethral (mL): 5 mL    Norepinephrine: 0 mL  Total OUT: 5 mL    Total NET: 621.4 mL      LABS:                        13.3   6.58  )-----------( 140      ( 11 Apr 2024 12:32 )             41.9     04-11    142  |  105  |  28<H>  ----------------------------<  213<H>  4.1   |  22  |  1.88<H>    Ca    8.9      11 Apr 2024 12:49  Mg     2.3     04-11    TPro  4.6<L>  /  Alb  1.9<L>  /  TBili  0.7  /  DBili  x   /  AST  313<H>  /  ALT  261<H>  /  AlkPhos  115  04-11      CARDIAC MARKERS ( 11 Apr 2024 12:49 )  x     / x     / 171 U/L / x     / 3.0 ng/mL      CAPILLARY BLOOD GLUCOSE        Urinalysis Basic - ( 11 Apr 2024 12:49 )    Color: x / Appearance: x / SG: x / pH: x  Gluc: 213 mg/dL / Ketone: x  / Bili: x / Urobili: x   Blood: x / Protein: x / Nitrite: x   Leuk Esterase: x / RBC: x / WBC x   Sq Epi: x / Non Sq Epi: x / Bacteria: x      CULTURES:      Physical Examination:    General: No acute distress.      HEENT: Pupils fixed b/l     PULM: Clear to auscultation b/l, +ETT     CVS: Regular rate and rhythm    ABD: Soft, nondistended    EXT: No edema, nontender    SKIN: Warm and well perfused, no rashes noted.    NEURO: No gag reflex, does not withdraw from painful stimuli      Patient is a 78y old  Male who presents with a chief complaint of cardiac arrest (11 Apr 2024 17:07)    BRIEF HOSPITAL COURSE: 78 year old male with a PMH of HTN, ulcerative colitis, worsening anxiety/paranoia recently as per chart review who presented to the ED s/p cardiac arrest with ROSC after a reported suicide attempt by hanging.  Was intubated in the field and received 3 epi, 1 bicarb, and 1 calcium.  As per ED note approximately 20 minutes to ROSC.  Unknown down time prior.  Admitted to SPCU for further management.      ROS: Unable to obtain     PAST MEDICAL & SURGICAL HISTORY:  H/O: HTN (hypertension)  H/O ulcerative colitis      Medications:  EPINEPHrine    Infusion 0.05 MICROgram(s)/kG/Min IV Continuous <Continuous>  norepinephrine Infusion 0.05 MICROgram(s)/kG/Min IV Continuous <Continuous>  acetaminophen     Tablet .. 650 milliGRAM(s) Oral every 6 hours PRN  melatonin 3 milliGRAM(s) Oral at bedtime PRN  ondansetron Injectable 4 milliGRAM(s) IV Push every 8 hours PRN  enoxaparin Injectable 30 milliGRAM(s) SubCutaneous every 24 hours  aluminum hydroxide/magnesium hydroxide/simethicone Suspension 30 milliLiter(s) Oral every 4 hours PRN  pantoprazole  Injectable 40 milliGRAM(s) IV Push daily  sodium chloride 0.9%. 1000 milliLiter(s) IV Continuous <Continuous>  chlorhexidine 0.12% Liquid 15 milliLiter(s) Oral Mucosa every 12 hours        Mode: AC/ CMV (Assist Control/ Continuous Mandatory Ventilation)  RR (machine): 18  TV (machine): 450  FiO2: 100  PEEP: 5  ITime: 1  MAP: 10  PIP: 27      ICU Vital Signs Last 24 Hrs  T(C): 36.7 (11 Apr 2024 19:00), Max: 36.7 (11 Apr 2024 19:00)  T(F): 98.1 (11 Apr 2024 19:00), Max: 98.1 (11 Apr 2024 19:00)  HR: 109 (11 Apr 2024 19:00) (86 - 121)  BP: 106/80 (11 Apr 2024 19:00) (48/28 - 188/85)  BP(mean): 90 (11 Apr 2024 19:00) (80 - 98)  ABP: --  ABP(mean): --  RR: 17 (11 Apr 2024 19:00) (14 - 26)  SpO2: 100% (11 Apr 2024 19:00) (94% - 100%)    O2 Parameters below as of 11 Apr 2024 16:05  Patient On (Oxygen Delivery Method): ventilator, PEEP 5    O2 Concentration (%): 100        ABG - ( 11 Apr 2024 12:34 )  pH, Arterial: 7.03  pH, Blood: x     /  pCO2: 66    /  pO2: 290   / HCO3: 17    / Base Excess: -13.4 /  SaO2: 100.0         I&O's Detail    11 Apr 2024 07:01  -  11 Apr 2024 19:28  --------------------------------------------------------  IN:    EPINEPHrine: 476.4 mL    sodium chloride 0.9%: 150 mL  Total IN: 626.4 mL    OUT:    Indwelling Catheter - Urethral (mL): 5 mL    Norepinephrine: 0 mL  Total OUT: 5 mL    Total NET: 621.4 mL      LABS:                        13.3   6.58  )-----------( 140      ( 11 Apr 2024 12:32 )             41.9     04-11    142  |  105  |  28<H>  ----------------------------<  213<H>  4.1   |  22  |  1.88<H>    Ca    8.9      11 Apr 2024 12:49  Mg     2.3     04-11    TPro  4.6<L>  /  Alb  1.9<L>  /  TBili  0.7  /  DBili  x   /  AST  313<H>  /  ALT  261<H>  /  AlkPhos  115  04-11      CARDIAC MARKERS ( 11 Apr 2024 12:49 )  x     / x     / 171 U/L / x     / 3.0 ng/mL      CAPILLARY BLOOD GLUCOSE        Urinalysis Basic - ( 11 Apr 2024 12:49 )    Color: x / Appearance: x / SG: x / pH: x  Gluc: 213 mg/dL / Ketone: x  / Bili: x / Urobili: x   Blood: x / Protein: x / Nitrite: x   Leuk Esterase: x / RBC: x / WBC x   Sq Epi: x / Non Sq Epi: x / Bacteria: x      CULTURES:      Physical Examination:    General: No acute distress.      HEENT: Pupils fixed b/l     PULM: Clear to auscultation b/l, +ETT     CVS: Regular rate and rhythm    ABD: Soft, nondistended    EXT: No edema, nontender    SKIN: Warm and well perfused, no rashes noted.    NEURO: No gag reflex, does not withdraw from painful stimuli

## 2024-04-11 NOTE — ED PROVIDER NOTE - DIFFERENTIAL DIAGNOSIS
Differential Diagnosis Presenting to the emergency room status post reported self hanging with ROSC achieved in the field.  Will obtain screening labs check tox levels continue mechanical ventilation pan scan and monitor.  At this time patient has no signs of overbreathing the vent is not on sedation and has pupils that are fixed and dilated.  High suspicion for anoxic brain injury.  Overall prognosis extremely poor

## 2024-04-11 NOTE — ED PROVIDER NOTE - CRITICAL CARE ATTENDING CONTRIBUTION TO CARE
Patient is a 78-year-old male who presented as a cardiac arrest with ROSC status post reported self hanging.  Past medical history of hypertension ulcerative colitis.  Was recently seen in the emergency room on March 6 for evaluation of increasing paranoia.  It is documented he had been recently started on Lexapro.  Also documented that patient had extreme anxiety and thought someone was out to get him.  He was seen evaluated by psychiatry and ultimately transferred to [facility].  Presents today status postcardiac arrest with ROSC.  Per EMS patient was found hanging in his closet.  Had last been seen an hour and a half prior.  Received 3 epi 1 bicarb 1 calcium and route with return of ROSC was intubated.  Patient had been kneeling with a belt tied around his neck when he was found by police. CPR was approx 20 min prior to ROSC. Intubated in the field with a 7.5 ET tube. Presenting to the emergency room status post reported self hanging with ROSC achieved in the field.  Will obtain screening labs check tox levels continue mechanical ventilation pan scan and monitor.  At this time patient has no signs of overbreathing the vent is not on sedation and has pupils that are fixed and dilated.  High suspicion for anoxic brain injury.  Overall prognosis extremely poor.  Shortly after arrival patient again went into cardiac arrest was given an epi bicarb with return of spontaneous circulation.  Patient was medically managed with an additional brief episode of cardiac arrest again ROSC achieved after dose of medication.  Please see code street for further information.  Patient then developed hypotension was initially started on Levophed but appeared to like epi better and so ultimately the drip was changed to an epinephrine drip and patient then normalized vital signs after this.  Results of labs reviewed patient with a white count of 6 hemoglobin of 13.3 troponin of 169.7 EKG with no evidence of STEMI may be demand in the setting of hypoxia was also consider NSTEMI given hypoxic event remainder of CMP noted renal insufficiency seen will continue to trend mildly elevated LFTs shock liver must be in the differential at this time.  ABG noted patient extremely acidotic received multiple pushes of bicarb pCO2 noted vent settings changed PaO2 noted.  Frey was placed for strict I's and O's urine noted.  Tylenol and aspirin levels were negligible CT imaging of the head reveals no evidence of acute hemorrhage or mass effect degenerative changes of the C-spine no evidence of acute fracture CT imaging of the chest abdomen pelvis reveal pulmonary edema no acute visceral injury.  Discussed possible CTA imaging of the neck with admitting physician at this time will defer as patient has an overall poor prognosis.  Independent review of chest x-ray reveals ET tube in position pulmonary edema independent review of EKG reveals normal sinus rhythm at a rate of 98 bpm.  Patient's overall prognosis was reviewed with wife in great detail at bedside.  At this time we will continue epi drip and ventilation.  Advised high suspicion for anoxic brain injury.  Wife was encouraged to call patient's daughter.  She was able to reach the daughter who will travel in from [place].  Will admit at this time to intensive care unit further goals of care will be discussed.

## 2024-04-11 NOTE — PATIENT PROFILE ADULT - FALL HARM RISK - HARM RISK INTERVENTIONS

## 2024-04-11 NOTE — ED PROVIDER NOTE - OBJECTIVE STATEMENT
Patient is a 78-year-old male who presented as a cardiac arrest with ROSC status post reported self hanging.  Past medical history of hypertension ulcerative colitis.  Was recently seen in the emergency room on March 6 for evaluation of increasing paranoia.  It is documented he had been recently started on Lexapro.  Also documented that patient had extreme anxiety and thought someone was out to get him.  He was seen evaluated by psychiatry and ultimately transferred to [facility].  Presents today status postcardiac arrest with ROSC.  Per EMS patient was found hanging in his closet.  Had last been seen an hour and a half prior.  Received 3 epi 1 bicarb 1 calcium and route with return of ROSC was intubated.  Patient had been kneeling with a rope tied around his neck when he was found by police. CPR was approx 20 min prior to ROSC. Intubated in the field with a 7.5 ET tube Patient is a 78-year-old male who presented as a cardiac arrest with ROSC status post reported self hanging.  Past medical history of hypertension ulcerative colitis.  Was recently seen in the emergency room on March 6 for evaluation of increasing paranoia.  It is documented he had been recently started on Lexapro.  Also documented that patient had extreme anxiety and thought someone was out to get him.  He was seen evaluated by psychiatry and ultimately transferred to [facility].  Presents today status postcardiac arrest with ROSC.  Per EMS patient was found hanging in his closet.  Had last been seen an hour and a half prior.  Received 3 epi 1 bicarb 1 calcium and route with return of ROSC was intubated.  Patient had been kneeling with a belt tied around his neck when he was found by police. CPR was approx 20 min prior to ROSC. Intubated in the field with a 7.5 ET tube

## 2024-04-11 NOTE — ED PROVIDER NOTE - CLINICAL SUMMARY MEDICAL DECISION MAKING FREE TEXT BOX
Patient is a 78-year-old male who presented as a cardiac arrest with ROSC status post reported self hanging.  Past medical history of hypertension ulcerative colitis.  Was recently seen in the emergency room on March 6 for evaluation of increasing paranoia.  It is documented he had been recently started on Lexapro.  Also documented that patient had extreme anxiety and thought someone was out to get him.  He was seen evaluated by psychiatry and ultimately transferred to [facility].  Presents today status postcardiac arrest with ROSC.  Per EMS patient was found hanging in his closet.  Had last been seen an hour and a half prior.  Received 3 epi 1 bicarb 1 calcium and route with return of ROSC was intubated.  Patient had been kneeling with a belt tied around his neck when he was found by police. CPR was approx 20 min prior to ROSC. Intubated in the field with a 7.5 ET tube. Presenting to the emergency room status post reported self hanging with ROSC achieved in the field.  Will obtain screening labs check tox levels continue mechanical ventilation pan scan and monitor.  At this time patient has no signs of overbreathing the vent is not on sedation and has pupils that are fixed and dilated.  High suspicion for anoxic brain injury.  Overall prognosis extremely poor.  Shortly after arrival patient again went into cardiac arrest was given an epi bicarb with return of spontaneous circulation.  Patient was medically managed with an additional brief episode of cardiac arrest again ROSC achieved after dose of medication.  Please see code street for further information.  Patient then developed hypotension was initially started on Levophed but appeared to like epi better and so ultimately the drip was changed to an epinephrine drip and patient then normalized vital signs after this.  Results of labs reviewed patient with a white count of 6 hemoglobin of 13.3 troponin of 169.7 EKG with no evidence of STEMI may be demand in the setting of hypoxia was also consider NSTEMI given hypoxic event remainder of CMP noted renal insufficiency seen will continue to trend mildly elevated LFTs shock liver must be in the differential at this time.  ABG noted patient extremely acidotic received multiple pushes of bicarb pCO2 noted vent settings changed PaO2 noted.  Frey was placed for strict I's and O's urine noted.  Tylenol and aspirin levels were negligible CT imaging of the head reveals no evidence of acute hemorrhage or mass effect degenerative changes of the C-spine no evidence of acute fracture CT imaging of the chest abdomen pelvis reveal pulmonary edema no acute visceral injury.  Discussed possible CTA imaging of the neck with admitting physician at this time will defer as patient has an overall poor prognosis.  Independent review of chest x-ray reveals ET tube in position pulmonary edema independent review of EKG reveals normal sinus rhythm at a rate of 98 bpm.  Patient's overall prognosis was reviewed with wife in great detail at bedside.  At this time we will continue epi drip and ventilation.  Advised high suspicion for anoxic brain injury.  Wife was encouraged to call patient's daughter.  She was able to reach the daughter who will travel in from [place].  Will admit at this time to intensive care unit further goals of care will be discussed.

## 2024-04-11 NOTE — ED ADULT TRIAGE NOTE - CHIEF COMPLAINT QUOTE
Patient comes in s/p hanging from closet. Patient was found by wife last at 11am. Patient found with no pulses, asystole by EMS. Patient received 3 EPIs, 1 bicarb, 1 calcium via EMS. EMS retrieved ROSC at approximately 12pm. Patient intubated by EMS en route.

## 2024-04-11 NOTE — CONSULT NOTE ADULT - SUBJECTIVE AND OBJECTIVE BOX
Date/Time Patient Seen:  		  Referring MD:   Data Reviewed	       Patient is a 78y old  Male who presents with a chief complaint of     Subjective/HPI   · Chief Complaint: The patient is a 78y Male complaining of code: adult cardiac.  · HPI Objective Statement: Patient is a 78-year-old male who presented as a cardiac arrest with ROSC status post reported self hanging.  Past medical history of hypertension ulcerative colitis.  Was recently seen in the emergency room on March 6 for evaluation of increasing paranoia.  It is documented he had been recently started on Lexapro.  Also documented that patient had extreme anxiety and thought someone was out to get him.  He was seen evaluated by psychiatry and ultimately transferred to [facility].  Presents today status postcardiac arrest with ROSC.  Per EMS patient was found hanging in his closet.  Had last been seen an hour and a half prior.  Received 3 epi 1 bicarb 1 calcium and route with return of ROSC was intubated.  Patient had been kneeling with a rope tied around his neck when he was found by police. CPR was approx 20 min prior to ROSC. Intubated in the field with a 7.5 ET tube    PAST MEDICAL & SURGICAL HISTORY:  H/O: HTN (hypertension)    H/O ulcerative colitis      HIV:    HIV Test Questions:  · In accordance with [place] State law, we offer every patient who comes to our ED an HIV test. Would you like to be tested today?	Previously Declined (within the last year)    ALLERGIES AND HOME MEDICATIONS:   Allergies:        Allergies:  	No Known Allergies:     Home Medications:   * Patient Currently Takes Medications as of 06-Mar-2024 19:09 documented in Structured Notes  · 	azaTHIOprine 50 mg oral tablet: 1 tab(s) orally  · 	sertraline 50 mg oral tablet: 1 tab(s) orally  · 	Lialda 1.2 g oral delayed release tablet: 2 tab(s) orally once a day  · 	valsartan 320 mg oral tablet: 1 tab(s) orally once a day  · 	Seroquel 25 mg oral tablet: 1 tab(s) orally      Medication list         MEDICATIONS  (STANDING):  chlorhexidine 0.12% Liquid 15 milliLiter(s) Oral Mucosa every 12 hours  EPINEPHrine    Infusion 0.05 MICROgram(s)/kG/Min (14.9 mL/Hr) IV Continuous <Continuous>  norepinephrine Infusion 0.05 MICROgram(s)/kG/Min (7.44 mL/Hr) IV Continuous <Continuous>    MEDICATIONS  (PRN):  acetaminophen     Tablet .. 650 milliGRAM(s) Oral every 6 hours PRN Temp greater or equal to 38C (100.4F), Mild Pain (1 - 3)  aluminum hydroxide/magnesium hydroxide/simethicone Suspension 30 milliLiter(s) Oral every 4 hours PRN Dyspepsia  melatonin 3 milliGRAM(s) Oral at bedtime PRN Insomnia  ondansetron Injectable 4 milliGRAM(s) IV Push every 8 hours PRN Nausea and/or Vomiting         Vitals log        ICU Vital Signs Last 24 Hrs  T(C): --  T(F): --  HR: 104 (11 Apr 2024 16:08) (86 - 121)  BP: 150/79 (11 Apr 2024 14:50) (48/28 - 188/85)  BP(mean): 80 (11 Apr 2024 13:05) (80 - 80)  ABP: --  ABP(mean): --  RR: 21 (11 Apr 2024 14:50) (15 - 21)  SpO2: 100% (11 Apr 2024 16:08) (94% - 100%)    O2 Parameters below as of 11 Apr 2024 14:50  Patient On (Oxygen Delivery Method): ventilator             Mode: AC/ CMV (Assist Control/ Continuous Mandatory Ventilation)  RR (machine): 18  TV (machine): 450  FiO2: 100  PEEP: 5  ITime: 1  MAP: 10  PIP: 27      Input and Output:  I&O's Detail      Lab Data                        13.3   6.58  )-----------( 140      ( 11 Apr 2024 12:32 )             41.9     04-11    142  |  105  |  28<H>  ----------------------------<  213<H>  4.1   |  22  |  1.88<H>    Ca    8.9      11 Apr 2024 12:49  Mg     2.3     04-11    TPro  4.6<L>  /  Alb  1.9<L>  /  TBili  0.7  /  DBili  x   /  AST  313<H>  /  ALT  261<H>  /  AlkPhos  115  04-11    ABG - ( 11 Apr 2024 12:34 )  pH, Arterial: 7.03  pH, Blood: x     /  pCO2: 66    /  pO2: 290   / HCO3: 17    / Base Excess: -13.4 /  SaO2: 100.0             CARDIAC MARKERS ( 11 Apr 2024 12:49 )  x     / x     / 171 U/L / x     / 3.0 ng/mL        Review of Systems	  resp failure  c collar      Objective     Physical Examination    heart s1s2  lung dc bS  head nc  chest symmetric  c collar      Pertinent Lab findings & Imaging      Frey:  NO   Adequate UO     I&O's Detail           Discussed with:     Cultures:	        Radiology      ACC: [id number] EXAM:  CT ABDOMEN AND PELVIS   ORDERED BY: AYANNA VILLAGOMEZ     ACC: [id number] EXAM:  CT CHEST   ORDERED BY: AYANNA VILLAGOMEZ     PROCEDURE DATE:  04/11/2024          INTERPRETATION:  CLINICAL INFORMATION: Cardiac arrest by hanging    COMPARISON: None.    CONTRAST/COMPLICATIONS:  IV Contrast: NONE  Oral Contrast: NONE  Complications: None reported at time of study completion    PROCEDURE:  CT of the Chest, Abdomen and Pelvis was performed.  Sagittal and coronal reformats were performed.    FINDINGS:    Please note that evaluation of the chest/abdominal organs and vascular   structures is limited by lack of intravenous contrast.    CHEST:  LUNGS AND LARGE AIRWAYS: Endotracheal tube in place. Septal thickening   and bilateral lung opacities. Dependent atelectasis. Scattered calcified   granulomas.  PLEURA: No pleural effusion or pneumothorax.  VESSELS: Atherosclerotic calcifications of thoracic aorta and coronary   arteries.  HEART: Heart size is normal. Aortic valve calcifications. No pericardial   effusion.  MEDIASTINUM AND ARMAND: No lymphadenopathy.  CHEST WALL AND LOWER NECK: Within normal limits.    ABDOMEN AND PELVIS:  LIVER: Within normal limits.  BILE DUCTS: Normal caliber.  GALLBLADDER: Within normal limits.  SPLEEN: Within normal limits.  PANCREAS: Mild fatty replacement.  ADRENALS: Within normal limits.  KIDNEYS/URETERS: Renal cysts, including 4.6 cm right renal cyst with   peripheral calcifications. No hydronephrosis.    BLADDER: Frey catheter. Urachal cyst or diverticulum.  REPRODUCTIVE ORGANS: Prostate is enlarged. Fiducial markers noted.    BOWEL: No bowel obstruction. Appendix is within normal limits. Colonic   diverticulosis.  PERITONEUM: No ascites.  VESSELS: Atherosclerotic calcifications.  RETROPERITONEUM/LYMPH NODES: No lymphadenopathy.  ABDOMINAL WALL: Tiny fat-containing hernia. Small bilateral   fat-containing hernias.  BONES: Degenerative changes.    IMPRESSION:  Lung findings suggestive of pulmonary edema.  No findings suspicious for acute intra-abdominal visceral injury.      --- End of Report ---            MARK OLEA MD; Attending Radiologist  This document has been electronically signed. Apr 11 2024  2:36PM

## 2024-04-11 NOTE — ED ADULT NURSE NOTE - OBJECTIVE STATEMENT
78-year-old male who presented as a cardiac arrest with ROSC status post reported self hanging.    Was recently seen in the emergency room on March 6 for evaluation of increasing paranoia.  It is documented he had been recently started on Lexapro.  Also documented that patient had extreme anxiety and thought someone was out to get him.  He was seen evaluated by psychiatry and ultimately transferred to [facility].  Presents today status postcardiac arrest with ROSC.  Per EMS patient was found hanging in his closet.  Had last been seen an hour and a half prior.  Received 3 epi 1 bicarb 1 calcium and route with return of ROSC was intubated.  Patient had been kneeling with a rope tied around his neck when he was found by police. CPR was approx 20 min prior to ROSC. Intubated in the field with a 7.5 ET tube

## 2024-04-11 NOTE — H&P ADULT - ASSESSMENT
78M with anxiety, depression, paranoia, ulcerative colitis and HTN presenting post cardiac arrest with ROSC after self inflicted hanging in suicide attempt    Cardiac arrest s/p ROSC   Respiratory failure   Asphyxiation due to hanging in suicide attempt   Paranoia  Depression/anxiety   Gentle IV hydration, PPI, DVT ppx, aspiration precautions   Repeat abg AM  1:1 observation, suicide precautions  DNR/DNI, family leaning towards comfort measures   Ongoing management discussed with wife at bedside     Transaminitis   Troponemia  Likely demand/hypoxia periarrest   EKG with no acute ischemic changes  Trend LFTs    PRESTON on CKD  Gentle hydration, monitor uop and renal indices      78M with anxiety, depression, paranoia, ulcerative colitis and HTN presenting post cardiac arrest with ROSC after self inflicted hanging in suicide attempt    Cardiac arrest s/p ROSC   Respiratory failure   Asphyxiation due to hanging in suicide attempt   Paranoia  Depression/anxiety   Gentle IV hydration, PPI, DVT ppx, aspiration precautions   Repeat abg AM  1:1 observation, suicide precautions  DNR/DNI, family leaning towards comfort measures and opting not to pursue further care at this time  Plan to readdress this again tomorrow  Ongoing management discussed with wife at bedside     Transaminitis   Troponemia  Likely demand/hypoxia periarrest   EKG with no acute ischemic changes  Trend LFTs    PRESTON on CKD  Gentle hydration, monitor uop and renal indices

## 2024-04-12 NOTE — CONSULT NOTE ADULT - ASSESSMENT
The patient is a 78 year old male with a history of HTN, paranoia, UC, depression who presented with cardiac arrest after suicide attempt.    Plan:  - ECG with no evidence of ischemia or infarction  - Troponin elevated at 9108 in the setting of demand ischemia from hypoxia and cardiac arrest  - CT chest with pulm edema  - If ongoing aggressive care, check echo  - On epinephrine  - Likely significant anoxic brain injury  - Neurology eval  - Mechanical ventilation  - ICU care  - GOC discussions

## 2024-04-12 NOTE — CASE MANAGEMENT PROGRESS NOTE - NSCMPROGRESSNOTE_GEN_ALL_CORE
Update Communication Note: As per morning rounds on Special Care Unit, Family speaking with the attending, Pending goals of care conversation. Will continue to follow case.

## 2024-04-12 NOTE — PROGRESS NOTE ADULT - ASSESSMENT
78-year-old male who presented as a cardiac arrest with ROSC status post reported self hanging.  Past medical history of hypertension ulcerative colitis.  Was recently seen in the emergency room on March 6 for evaluation of increasing paranoia.     resp failure  card arrest  rosc  UC  depression  paranoia hx  met acidosis  PRESTON  CKD    prognosis poor  spoke with family  they are leaning toward CMO  SPCU care and support  I and O  hydration  trend LABS  oral hygiene  skin care  HOB elev  asp prec  CT imaging reviewed  LABS reviewed  old records reviewed

## 2024-04-12 NOTE — DISCHARGE NOTE FOR THE EXPIRED PATIENT - HOSPITAL COURSE
78M with anxiety, depression, paranoia, ulcerative colitis and HTN presenting post cardiac arrest with ROSC after self inflicted hanging in suicide attempt.   The patient was found by his wife, Emilia who called EMS. EMS performed ACLS with ROSC and pt was intubated for airway protection.   On arrival, pt was already intubated and required vasopressors for hemodynamic support.   He was breathing over the vent, other brainstem reflexes found to be absent.   Blood alcohol levels and urine tox were all negative.   CT head demonstrated no acute findings.      78M with anxiety, depression, paranoia, ulcerative colitis and HTN presenting post cardiac arrest with ROSC after self inflicted hanging in suicide attempt on 4/11/24.   The patient was found by his wife, Emilia who called EMS. EMS performed ACLS with ROSC and pt was intubated for airway protection.   On arrival, pt was already intubated and required vasopressors for hemodynamic support.   He was breathing over the vent, other brainstem reflexes found to be absent.   Blood alcohol levels and urine tox were all negative.   CT head demonstrated no acute findings.   Family opted to pursue DNR/DNI, comfort measures and terminal extubation  The patient was given the appropriate comfort medications, vasopressor support stopped and terminally extubated   He passed away peacefully at 12:40PM on 4/12/2024  Emotional support and ongoing management discussed several times with wife, Emilia & daughter, Khadra who expressed gratitude.     Family had declined organ donation  Declined autopsy  I notified the medical examiner (Ricco, case # )

## 2024-04-12 NOTE — CHART NOTE - NSCHARTNOTEFT_GEN_A_CORE
Discussed with Dr. Cha.  Family would like to proceed with compassionate extubation.  Please contact [facility]- if not deemed a donor, can proceed with extubation when/if family in agreement.    Prior to extubation, please give x1 glycopyrrolate, x1 ativan and x1 dilaudid as ordered  Post extubation, would have the following orders available:    In the event family considers/decides on a comfort based approach, would recommend the following:  Given organ dysfunction (renal +/- hepatic), would avoid morphine.  IV dilaudid 0.2mg q2h prn for moderate pain  IV dilaudid 0.5mg q2h prn for severe pain  IV dilaudid 0.5mg q2h prn for dyspnea- goal RR <24  IV ativan 0.5mg q2h prn for anxiety, agitation, refractory dyspnea  IV glycopyrrolate 0.2mg q6h prn for secretions  dulcolax NH PRN constipation, daily    Remain available.  Estrella Cedeno MD, [facility]; Palliative Care Attending, Ethicist. [phone]

## 2024-04-12 NOTE — CARE COORDINATION ASSESSMENT. - NSCAREPROVIDERS_GEN_ALL_CORE_FT
CARE PROVIDERS:  Accepting Physician: MAGNUS Cha  Access Services: Chaitanya Sage  Access Services: Fatuma aVsquez  Access Services: Isael Alvarez  Administration: Evie Sage  Administration: Kaur Bojorquez  Admitting: MAGNUS Cha  Attending: MAGNUS Cha  Consultant: Raymundo Antunez  Consultant: Blake Negrete  Consultant: Rocael Daniel  Consultant: Estrella Cedeno  Covering Team: MAGNUS Cha  Covering Team: Gabi Croft  Covering Team: Karson Oconnor  ED Attending: Beverley Zendejas ED Nurse: Amna Gao  Infection Control: Glenda Walker  Nurse: Bernie Hopkins  Nurse: Nathalia Lopez  Nurse: Nargis Jimenez  Nurse: Santy Zavaleta  Nurse: Maikel Wise  Nurse: Marck Mcrae  Nurse: Marilu Rodriguez  Ordered: Doctor, Unknown  Ordered: Physician, Ordering  Override: Elizabeth Brown  Override: Amna Gao  PCA/Nursing Assistant: Bisi Osman  PCA/Nursing Assistant: Leonides Akins  Primary Team: Salma Ryder  Registered Dietitian: Brenda Cornejo  Respiratory Therapy: Obdulio Hoang  Respiratory Therapy: Laura Fitzgerald  Respiratory Therapy: Oleg Scott  Respiratory Therapy: Emmy Rodriguez  : Fanta Dias  : Shazia Castaneda  Support Svcs./Ancillary Svcs.: Zaira Peñaloza  Team: [facility], Team  Team: [facility]  Hospitalists, Team  Team: [facility], Team  UR// Supp. Assoc.: Ayla Morfin

## 2024-04-12 NOTE — PROGRESS NOTE ADULT - SUBJECTIVE AND OBJECTIVE BOX
Date/Time Patient Seen:  		  Referring MD:   Data Reviewed	       Patient is a 78y old  Male who presents with a chief complaint of cardiac arrest (11 Apr 2024 19:28)      Subjective/HPI     PAST MEDICAL & SURGICAL HISTORY:  H/O: HTN (hypertension)    H/O ulcerative colitis          Medication list         MEDICATIONS  (STANDING):  chlorhexidine 0.12% Liquid 15 milliLiter(s) Oral Mucosa every 12 hours  EPINEPHrine    Infusion 0.05 MICROgram(s)/kG/Min (14.9 mL/Hr) IV Continuous <Continuous>  heparin   Injectable 5000 Unit(s) SubCutaneous every 12 hours  lactated ringers. 1000 milliLiter(s) (100 mL/Hr) IV Continuous <Continuous>  norepinephrine Infusion 0.05 MICROgram(s)/kG/Min (7.44 mL/Hr) IV Continuous <Continuous>  pantoprazole  Injectable 40 milliGRAM(s) IV Push daily    MEDICATIONS  (PRN):  acetaminophen     Tablet .. 650 milliGRAM(s) Oral every 6 hours PRN Temp greater or equal to 38C (100.4F), Mild Pain (1 - 3)  aluminum hydroxide/magnesium hydroxide/simethicone Suspension 30 milliLiter(s) Oral every 4 hours PRN Dyspepsia  melatonin 3 milliGRAM(s) Oral at bedtime PRN Insomnia  ondansetron Injectable 4 milliGRAM(s) IV Push every 8 hours PRN Nausea and/or Vomiting         Vitals log        ICU Vital Signs Last 24 Hrs  T(C): 35.9 (12 Apr 2024 04:30), Max: 38.8 (12 Apr 2024 00:30)  T(F): 96.6 (12 Apr 2024 04:30), Max: 101.8 (12 Apr 2024 00:30)  HR: 81 (12 Apr 2024 05:30) (75 - 121)  BP: 123/59 (12 Apr 2024 05:30) (48/28 - 188/85)  BP(mean): 75 (12 Apr 2024 05:30) (75 - 100)  ABP: --  ABP(mean): --  RR: 14 (12 Apr 2024 05:30) (14 - 26)  SpO2: 100% (12 Apr 2024 05:30) (94% - 100%)    O2 Parameters below as of 11 Apr 2024 16:05  Patient On (Oxygen Delivery Method): ventilator, PEEP 5    O2 Concentration (%): 100         Mode: AC/ CMV (Assist Control/ Continuous Mandatory Ventilation)  RR (machine): 18  TV (machine): 450  FiO2: 75  PEEP: 5  ITime: 1  MAP: 8  PIP: 12      Input and Output:  I&O's Detail    11 Apr 2024 07:01  -  12 Apr 2024 06:03  --------------------------------------------------------  IN:    EPINEPHrine: 1232.5 mL    Lactated Ringers: 300 mL    sodium chloride 0.9%: 150 mL  Total IN: 1682.5 mL    OUT:    Indwelling Catheter - Urethral (mL): 10 mL    Norepinephrine: 0 mL  Total OUT: 10 mL    Total NET: 1672.5 mL          Lab Data                        13.3   6.58  )-----------( 140      ( 11 Apr 2024 12:32 )             41.9     04-11    142  |  105  |  28<H>  ----------------------------<  213<H>  4.1   |  22  |  1.88<H>    Ca    8.9      11 Apr 2024 12:49  Mg     2.3     04-11    TPro  4.6<L>  /  Alb  1.9<L>  /  TBili  0.7  /  DBili  x   /  AST  313<H>  /  ALT  261<H>  /  AlkPhos  115  04-11    ABG - ( 11 Apr 2024 12:34 )  pH, Arterial: 7.03  pH, Blood: x     /  pCO2: 66    /  pO2: 290   / HCO3: 17    / Base Excess: -13.4 /  SaO2: 100.0             CARDIAC MARKERS ( 11 Apr 2024 12:49 )  x     / x     / 171 U/L / x     / 3.0 ng/mL        Review of Systems	      Objective     Physical Examination    heart s1s2  lung dc BS      Pertinent Lab findings & Imaging      Shante:  NO   Adequate UO     I&O's Detail    11 Apr 2024 07:01  -  12 Apr 2024 06:03  --------------------------------------------------------  IN:    EPINEPHrine: 1232.5 mL    Lactated Ringers: 300 mL    sodium chloride 0.9%: 150 mL  Total IN: 1682.5 mL    OUT:    Indwelling Catheter - Urethral (mL): 10 mL    Norepinephrine: 0 mL  Total OUT: 10 mL    Total NET: 1672.5 mL               Discussed with:     Cultures:	        Radiology

## 2024-04-12 NOTE — CARE COORDINATION ASSESSMENT. - OTHER PERTINENT DISCHARGE PLANNING INFORMATION:
Pt admitted s/p cardiac arrest from home after he was found kneeling with a rope tied around his neck. Pt was last admitted to [place] ED in early March for increasing paranoia and was transferred to [facility]. SW met with the pt's dtr Khadra and his wife at bedside. Dtr reported that the pt became depressed and paranoid on [id number] and was sent to [facility] where he was placed on seroquel and zoloft, which appeared to make him feel worse. His f/u psych appt was a month later. Goals of Care was discussed at bedside and the family plans to proceed with compassionate extubation, as pt's prognosis is poor. Emotional support provided.  was contacted.

## 2024-04-12 NOTE — CONSULT NOTE ADULT - SUBJECTIVE AND OBJECTIVE BOX
History of Present Illness: The patient is a 78 year old male with a history of HTN, paranoia, UC, depression who presented with cardiac arrest after suicide attempt. The patient is intubated and unable to provide any history. He was found hanging in his closet. He was found to be in asystole. He was intubated. CPR was performed for about 20 minutes prior to ROSC; 3 epi, 1 bicarb, 1 calcium was given.    Past Medical/Surgical History:  HTN, paranoia, UC, depression    Medications:  Home Medications:  azaTHIOprine 50 mg oral tablet: 1 tab(s) orally (06 Mar 2024 17:10)  Lialda 1.2 g oral delayed release tablet: 2 tab(s) orally once a day (06 Mar 2024 17:11)  Seroquel 25 mg oral tablet: 1 tab(s) orally (06 Mar 2024 17:12)  sertraline 50 mg oral tablet: 1 tab(s) orally (06 Mar 2024 17:07)  valsartan 320 mg oral tablet: 1 tab(s) orally once a day (06 Mar 2024 17:11)      Family History: Non-contributory family history of premature cardiovascular atherosclerotic disease    Social History: No tobacco, alcohol or drug use    Review of Systems:  General: No fevers, chills, weight gain  Skin: No rashes, color changes  Cardiovascular: No chest pain, orthopnea  Respiratory: No shortness of breath, cough  Gastrointestinal: No nausea, abdominal pain  Genitourinary: No incontinence, pain with urination  Musculoskeletal: No pain, swelling, decreased range of motion  Neurological: No headache, weakness  Psychiatric: No depression, anxiety  Endocrine: No weight gain, increased thirst  All other systems are comprehensively negative.    Physical Exam:  Vitals:        Vital Signs Last 24 Hrs  T(C): 36.4 (12 Apr 2024 07:32), Max: 38.8 (12 Apr 2024 00:30)  T(F): 97.5 (12 Apr 2024 07:32), Max: 101.8 (12 Apr 2024 00:30)  HR: 79 (12 Apr 2024 07:00) (75 - 121)  BP: 120/63 (12 Apr 2024 07:00) (48/28 - 188/85)  BP(mean): 80 (12 Apr 2024 07:00) (75 - 100)  RR: 16 (12 Apr 2024 07:00) (14 - 26)  SpO2: 100% (12 Apr 2024 07:00) (94% - 100%)  Parameters below as of 11 Apr 2024 16:05  Patient On (Oxygen Delivery Method): ventilator, PEEP 5  O2 Concentration (%): 100  General: NAD  HEENT: MMM  Neck: No JVD, no carotid bruit  Lungs: CTAB  CV: RRR, nl S1/S2, no M/R/G  Abdomen: S/NT/ND, +BS  Extremities: No LE edema, no cyanosis  Neuro: AAOx3, non-focal  Skin: No rash    Labs:                        14.8   33.03 )-----------( 228      ( 12 Apr 2024 06:00 )             46.6     04-12    146<H>  |  107  |  52<H>  ----------------------------<  407<H>  3.4<L>   |  18<L>  |  3.64<H>    Ca    8.6      12 Apr 2024 06:00  Phos  6.8     04-12  Mg     2.0     04-12    TPro  5.4<L>  /  Alb  2.5<L>  /  TBili  0.8  /  DBili  x   /  AST  491<H>  /  ALT  639<H>  /  AlkPhos  156<H>  04-12    CARDIAC MARKERS ( 11 Apr 2024 12:49 )  x     / x     / 171 U/L / x     / 3.0 ng/mL          ECG/Telemetry: NSR, normal axis, nonspecific ST abnormality

## 2024-04-12 NOTE — CARE COORDINATION ASSESSMENT. - REASON FOR CONSULT
emotional/coping/adjustment issues/end of life/hospice/grief and loss/psychosocial issues/suicide assessment/new chronic/terminal diagnosis